# Patient Record
Sex: FEMALE | Race: WHITE | NOT HISPANIC OR LATINO | Employment: STUDENT | URBAN - METROPOLITAN AREA
[De-identification: names, ages, dates, MRNs, and addresses within clinical notes are randomized per-mention and may not be internally consistent; named-entity substitution may affect disease eponyms.]

---
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
## 2021-01-04 ENCOUNTER — HEALTH MAINTENANCE LETTER (OUTPATIENT)
Age: 21
End: 2021-01-04

## 2021-01-04 DIAGNOSIS — R09.1 PLEURISY: Primary | ICD-10-CM

## 2021-01-04 DIAGNOSIS — R07.9 CHEST PAIN, UNSPECIFIED TYPE: ICD-10-CM

## 2021-01-04 DIAGNOSIS — M94.0 COSTOCHONDRITIS: ICD-10-CM

## 2021-01-06 ENCOUNTER — PRE VISIT (OUTPATIENT)
Dept: GASTROENTEROLOGY | Facility: CLINIC | Age: 21
End: 2021-01-06

## 2021-01-06 DIAGNOSIS — R06.00 DYSPNEA: Primary | ICD-10-CM

## 2021-01-06 NOTE — TELEPHONE ENCOUNTER
RECORDS RECEIVED FROM: internal    DATE RECEIVED: 1.8.21   NOTES STATUS DETAILS   OFFICE NOTE from referring provider Internal  Jc Becerril   OFFICE NOTE from other specialist Internal  12.2.20 Jone FLORES     DISCHARGE SUMMARY from hospital na    DISCHARGE REPORT from the ER Internal  12.15.20 Nataba    MEDICATION LIST Internal     IMAGING  (NEED IMAGES AND REPORTS)     CT SCAN Internal  12.15.20    CHEST XRAY (CXR) na    TESTS     PULMONARY FUNCTION TESTING (PFT) Scheduled  1.8.21

## 2021-01-07 DIAGNOSIS — R06.00 DYSPNEA: ICD-10-CM

## 2021-01-07 PROCEDURE — 94375 RESPIRATORY FLOW VOLUME LOOP: CPT | Performed by: INTERNAL MEDICINE

## 2021-01-07 PROCEDURE — 94726 PLETHYSMOGRAPHY LUNG VOLUMES: CPT | Performed by: INTERNAL MEDICINE

## 2021-01-07 PROCEDURE — 94729 DIFFUSING CAPACITY: CPT | Performed by: INTERNAL MEDICINE

## 2021-01-08 ENCOUNTER — TELEPHONE (OUTPATIENT)
Dept: PULMONOLOGY | Facility: CLINIC | Age: 21
End: 2021-01-08

## 2021-01-08 ENCOUNTER — PRE VISIT (OUTPATIENT)
Dept: PULMONOLOGY | Facility: CLINIC | Age: 21
End: 2021-01-08

## 2021-01-08 ENCOUNTER — VIRTUAL VISIT (OUTPATIENT)
Dept: PULMONOLOGY | Facility: CLINIC | Age: 21
End: 2021-01-08
Attending: FAMILY MEDICINE
Payer: COMMERCIAL

## 2021-01-08 DIAGNOSIS — R07.1 CHEST PAIN MADE WORSE BY BREATHING: ICD-10-CM

## 2021-01-08 DIAGNOSIS — M94.0 COSTOCHONDRITIS, ACUTE: ICD-10-CM

## 2021-01-08 DIAGNOSIS — R06.09 DYSPNEA ON EXERTION: ICD-10-CM

## 2021-01-08 DIAGNOSIS — U07.1 PNEUMONIA DUE TO 2019 NOVEL CORONAVIRUS: ICD-10-CM

## 2021-01-08 DIAGNOSIS — J45.20 INTERMITTENT ASTHMA WITHOUT COMPLICATION, UNSPECIFIED ASTHMA SEVERITY: Primary | ICD-10-CM

## 2021-01-08 DIAGNOSIS — J12.82 PNEUMONIA DUE TO 2019 NOVEL CORONAVIRUS: ICD-10-CM

## 2021-01-08 LAB
DLCOUNC-%PRED-PRE: 100 %
DLCOUNC-PRE: 28.54 ML/MIN/MMHG
DLCOUNC-PRED: 28.27 ML/MIN/MMHG
ERV-%PRED-PRE: 62 %
ERV-PRE: 1.16 L
ERV-PRED: 1.86 L
EXPTIME-PRE: 6.02 SEC
FEF2575-%PRED-PRE: 100 %
FEF2575-PRE: 4.44 L/SEC
FEF2575-PRED: 4.42 L/SEC
FEFMAX-%PRED-PRE: 76 %
FEFMAX-PRE: 6.18 L/SEC
FEFMAX-PRED: 8.1 L/SEC
FEV1-%PRED-PRE: 112 %
FEV1-PRE: 4.5 L
FEV1FEV6-PRE: 81 %
FEV1FEV6-PRED: 87 %
FEV1FVC-PRE: 81 %
FEV1FVC-PRED: 89 %
FEV1SVC-PRE: 89 %
FEV1SVC-PRED: 77 %
FIFMAX-PRE: 5.76 L/SEC
FRCPLETH-%PRED-PRE: 98 %
FRCPLETH-PRE: 3.08 L
FRCPLETH-PRED: 3.12 L
FVC-%PRED-PRE: 123 %
FVC-PRE: 5.59 L
FVC-PRED: 4.54 L
IC-%PRED-PRE: 118 %
IC-PRE: 3.9 L
IC-PRED: 3.29 L
RVPLETH-%PRED-PRE: 112 %
RVPLETH-PRE: 1.92 L
RVPLETH-PRED: 1.71 L
TLCPLETH-%PRED-PRE: 111 %
TLCPLETH-PRE: 6.98 L
TLCPLETH-PRED: 6.28 L
VA-%PRED-PRE: 110 %
VA-PRE: 6.9 L
VC-%PRED-PRE: 98 %
VC-PRE: 5.07 L
VC-PRED: 5.15 L

## 2021-01-08 PROCEDURE — 99417 PROLNG OP E/M EACH 15 MIN: CPT | Performed by: INTERNAL MEDICINE

## 2021-01-08 PROCEDURE — 99215 OFFICE O/P EST HI 40 MIN: CPT | Mod: GT | Performed by: INTERNAL MEDICINE

## 2021-01-08 RX ORDER — ALBUTEROL SULFATE 90 UG/1
2 AEROSOL, METERED RESPIRATORY (INHALATION) EVERY 4 HOURS PRN
Qty: 1 INHALER | Refills: 11 | Status: SHIPPED | OUTPATIENT
Start: 2021-01-08

## 2021-01-08 RX ORDER — NAPROXEN 500 MG/1
500 TABLET ORAL 2 TIMES DAILY WITH MEALS
Qty: 60 TABLET | Refills: 11 | Status: SHIPPED | OUTPATIENT
Start: 2021-01-08 | End: 2021-04-16

## 2021-01-08 NOTE — LETTER
"    1/8/2021         RE: Clarice Flores  [address]   [facility] [address]        Dear Colleague,    Thank you for referring your patient, Clarice Flores, to the [facility] [place]. Please see a copy of my visit note below.    Clarice is a 20 year old who is being evaluated via a billable video visit.      How would you like to obtain your AVS? MyChart  If the video visit is dropped, the invitation should be resent by: Other e-mail: [email or web address] / [email or web address]  Will anyone else be joining your video visit? 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  COVID test + 11/4/2020 with SOB and chest pain accompanying this but has never gone away.  Initially treated for possible heartburn but had worsening chest pain  11/11 Virtual visit with Dr. Becerril ([facility])  Had cardiac Echo and MRI (12/2) - both nl   Had normal EKG and troponin  Then had \"downward spiral\" with increased chest pain and some ANN, making it hard to practice (and to focus on school.  12/15/2020 ED Visit with SOB and L sided chest pain  W/o fever cough with normal exam and EKG  Labs notable for mild leukopenia (3.4) and normal CVC, lytes and procaclitonin.  Chest CT with PAgram done and WNL w/o PE  Chest pain improved somewhat on Naproxed prescribed by Dr. Becerril for costochondritis.  Took a break from exercise and swimmin over Xmas holiday  Overall has improved somewhat but still symptomatic  Her chest pain persists and is primarily either central lui-sternal area and/or lower lateral chest/rib cage  Her SOB/ANN is " "soemwhat better  Her dry cough is \"coming back\" - w/o wheeze/phlegm and w/o hemoptysis  PFTs were difficult for her to do.    Meds:  BCP  Allergies: NKA  PMH:  concusccion ~1 year ago  FH negative for lung disease/allergies/asthma       Parents and 1 younger sister A&W  SH:  Never smoker; no other durgs/substances; no vaping; single; no children  ROS - o/w negative;  No off GERD Rx; tolerated Naproxen well previously (now off)          Current Outpatient Medications   Medication     norgestimate-ethinyl estradiol (ORTHO-CYCLEN) 0.25-35 MG-MCG tablet     omeprazole (FIRST-OMEPRAZOLE) 2 MG/ML SUSP     omeprazole (PRILOSEC) 40 MG DR capsule     No current facility-administered medications for this visit.      No Known Allergies  Past Medical History:   Diagnosis Date     Concussion      Stress fracture        No past surgical history on file.    Social History     Socioeconomic History     Marital status: Single     Spouse name: Not on file     Number of children: Not on file     Years of education: Not on file     Highest education level: Not on file   Occupational History     Not on file   Social Needs     Financial resource strain: Not on file     Food insecurity     Worry: Not on file     Inability: Not on file     Transportation needs     Medical: Not on file     Non-medical: Not on file   Tobacco Use     Smoking status: Never Smoker     Smokeless tobacco: Never Used   Substance and Sexual Activity     Alcohol use: Never     Frequency: Never     Drug use: Never     Sexual activity: Never   Lifestyle     Physical activity     Days per week: Not on file     Minutes per session: Not on file     Stress: Not on file   Relationships     Social connections     Talks on phone: Not on file     Gets together: Not on file     Attends Denominational service: Not on file     Active member of club or organization: Not on file     Attends meetings of clubs or organizations: Not on file     Relationship status: Not on file     " Intimate partner violence     Fear of current or ex partner: Not on file     Emotionally abused: Not on file     Physically abused: Not on file     Forced sexual activity: Not on file   Other Topics Concern     Not on file   Social History Narrative     Not on file       ROS Pulmonary  A complete ROS was otherwise negative except as noted in the HPI.  LMP 12/13/2020 (Exact Date)   Exam:   GENERAL APPEARANCE: Well developed, well nourished, alert, and in no apparent distress.  No tachypnea, wheeze, stridor, cough  EYES: PERRL, EOMI.  SKIN: no rash on limited exam  NEURO: Mentation intact, speech normal, normal strength and tone, no abnormality of CN II- XII noted  PSYCH: mentation appears normal. and affect normal/bright  Results:  Recent Results (from the past 168 hour(s))   Pulmonary Function Test    Collection Time: 01/07/21  6:05 AM   Result Value Ref Range    FVC-Pred 4.54 L    FVC-Pre 5.59 L    FVC-%Pred-Pre 123 %    FEV1-Pre 4.50 L    FEV1-%Pred-Pre 112 %    FEV1FVC-Pred 89 %    FEV1FVC-Pre 81 %    FEFMax-Pred 8.10 L/sec    FEFMax-Pre 6.18 L/sec    FEFMax-%Pred-Pre 76 %    FEF2575-Pred 4.42 L/sec    FEF2575-Pre 4.44 L/sec    NVK1249-%Pred-Pre 100 %    ExpTime-Pre 6.02 sec    FIFMax-Pre 5.76 L/sec    VC-Pred 5.15 L    VC-Pre 5.07 L    VC-%Pred-Pre 98 %    IC-Pred 3.29 L    IC-Pre 3.90 L    IC-%Pred-Pre 118 %    ERV-Pred 1.86 L    ERV-Pre 1.16 L    ERV-%Pred-Pre 62 %    FEV1FEV6-Pred 87 %    FEV1FEV6-Pre 81 %    FRCPleth-Pred 3.12 L    FRCPleth-Pre 3.08 L    FRCPleth-%Pred-Pre 98 %    RVPleth-Pred 1.71 L    RVPleth-Pre 1.92 L    RVPleth-%Pred-Pre 112 %    TLCPleth-Pred 6.28 L    TLCPleth-Pre 6.98 L    TLCPleth-%Pred-Pre 111 %    DLCOunc-Pred 28.27 ml/min/mmHg    DLCOunc-Pre 28.54 ml/min/mmHg    DLCOunc-%Pred-Pre 100 %    VA-Pre 6.90 L    VA-%Pred-Pre 110 %    FEV1SVC-Pred 77 %    FEV1SVC-Pre 89 %       Assessment and plan:   Post-COVID Pulmonary ANN & Chest Pain    Although the patient is not very ill, this is a  "complex diagnostic problem.  No evidence for post-COVID cardiac issues.  No evidence for PE and PFTs are relawtively normal.    Likely that COVID has triggered two problems - chest pain largely due to costochondritis with possible pleurtic component and bronchoscpasm - ? Exercise induced.    Plan  1)  Possible Asthma - exercise-induced and/or cough variant         Breo Ellipta inhaler - use 1 inhalation each AM. Rinse mouth after use         \"Rescue\" albuterol inhaler - use 2 puffs for shortness of breath or 15 minutes pre-exercise  2)  Costochondritis - restart Naproxen 500 mgs twice daily    Follow Up      Contact me in 2 weeks by email and let me know how she is  doing.      If not improved then I would like to see in person      For acute problems, please contact our nursing staf    Total time spent by me today including reviewing personally prior testing, with patient in video visit and providing web-based inhaler training resources = 90 minutes    Jc Richard MD            "

## 2021-01-08 NOTE — PROGRESS NOTES
"Clarice is a 20 year old who is being evaluated via a billable video visit.      How would you like to obtain your AVS? MyChart  If the video visit is dropped, the invitation should be resent by: Other e-mail: [email or web address]  Will anyone else be joining your video visit? 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  COVID test + 11/4/2020 with SOB and chest pain accompanying this but has never gone away.  Initially treated for possible heartburn but had worsening chest pain  11/11 Virtual visit with Dr. Becerril ([facility])  Had cardiac Echo and MRI (12/2) - both nl   Had normal EKG and troponin  Then had \"downward spiral\" with increased chest pain and some ANN, making it hard to practice (and to focus on school.  12/15/2020 ED Visit with SOB and L sided chest pain  W/o fever cough with normal exam and EKG  Labs notable for mild leukopenia (3.4) and normal CVC, lytes and procaclitonin.  Chest CT with PAgram done and WNL w/o PE  Chest pain improved somewhat on Naproxed prescribed by Dr. Becerril for costochondritis.  Took a break from exercise and swimmin over Xmas holiday  Overall has improved somewhat but still symptomatic  Her chest pain persists and is primarily either central lui-sternal area and/or lower lateral chest/rib cage  Her SOB/ANN is soemwhat better  Her dry cough is \"coming back\" - w/o wheeze/phlegm and w/o hemoptysis  PFTs were difficult for her to do.    Meds:  BCP  Allergies: NKA  PMH:  concusccion ~1 year ago  FH negative for lung disease/allergies/asthma       Parents and 1 younger sister A&W  SH:  " Never smoker; no other durgs/substances; no vaping; single; no children  ROS - o/w negative;  No off GERD Rx; tolerated Naproxen well previously (now off)          Current Outpatient Medications   Medication     norgestimate-ethinyl estradiol (ORTHO-CYCLEN) 0.25-35 MG-MCG tablet     omeprazole (FIRST-OMEPRAZOLE) 2 MG/ML SUSP     omeprazole (PRILOSEC) 40 MG DR capsule     No current facility-administered medications for this visit.      No Known Allergies  Past Medical History:   Diagnosis Date     Concussion      Stress fracture        No past surgical history on file.    Social History     Socioeconomic History     Marital status: Single     Spouse name: Not on file     Number of children: Not on file     Years of education: Not on file     Highest education level: Not on file   Occupational History     Not on file   Social Needs     Financial resource strain: Not on file     Food insecurity     Worry: Not on file     Inability: Not on file     Transportation needs     Medical: Not on file     Non-medical: Not on file   Tobacco Use     Smoking status: Never Smoker     Smokeless tobacco: Never Used   Substance and Sexual Activity     Alcohol use: Never     Frequency: Never     Drug use: Never     Sexual activity: Never   Lifestyle     Physical activity     Days per week: Not on file     Minutes per session: Not on file     Stress: Not on file   Relationships     Social connections     Talks on phone: Not on file     Gets together: Not on file     Attends Roman [employer] service: Not on file     Active member of club or organization: Not on file     Attends meetings of clubs or organizations: Not on file     Relationship status: Not on file     Intimate partner violence     Fear of current or ex partner: Not on file     Emotionally abused: Not on file     Physically abused: Not on file     Forced sexual activity: Not on file   Other Topics Concern     Not on file   Social History Narrative     Not on file       ROS  Pulmonary  A complete ROS was otherwise negative except as noted in the HPI.  LMP 12/13/2020 (Exact Date)   Exam:   GENERAL APPEARANCE: Well developed, well nourished, alert, and in no apparent distress.  No tachypnea, wheeze, stridor, cough  EYES: PERRL, EOMI.  SKIN: no rash on limited exam  NEURO: Mentation intact, speech normal, normal strength and tone, no abnormality of CN II- XII noted  PSYCH: mentation appears normal. and affect normal/bright  Results:  Recent Results (from the past 168 hour(s))   Pulmonary Function Test    Collection Time: 01/07/21  6:05 AM   Result Value Ref Range    FVC-Pred 4.54 L    FVC-Pre 5.59 L    FVC-%Pred-Pre 123 %    FEV1-Pre 4.50 L    FEV1-%Pred-Pre 112 %    FEV1FVC-Pred 89 %    FEV1FVC-Pre 81 %    FEFMax-Pred 8.10 L/sec    FEFMax-Pre 6.18 L/sec    FEFMax-%Pred-Pre 76 %    FEF2575-Pred 4.42 L/sec    FEF2575-Pre 4.44 L/sec    THE3644-%Pred-Pre 100 %    ExpTime-Pre 6.02 sec    FIFMax-Pre 5.76 L/sec    VC-Pred 5.15 L    VC-Pre 5.07 L    VC-%Pred-Pre 98 %    IC-Pred 3.29 L    IC-Pre 3.90 L    IC-%Pred-Pre 118 %    ERV-Pred 1.86 L    ERV-Pre 1.16 L    ERV-%Pred-Pre 62 %    FEV1FEV6-Pred 87 %    FEV1FEV6-Pre 81 %    FRCPleth-Pred 3.12 L    FRCPleth-Pre 3.08 L    FRCPleth-%Pred-Pre 98 %    RVPleth-Pred 1.71 L    RVPleth-Pre 1.92 L    RVPleth-%Pred-Pre 112 %    TLCPleth-Pred 6.28 L    TLCPleth-Pre 6.98 L    TLCPleth-%Pred-Pre 111 %    DLCOunc-Pred 28.27 ml/min/mmHg    DLCOunc-Pre 28.54 ml/min/mmHg    DLCOunc-%Pred-Pre 100 %    VA-Pre 6.90 L    VA-%Pred-Pre 110 %    FEV1SVC-Pred 77 %    FEV1SVC-Pre 89 %       Assessment and plan:   Post-COVID Pulmonary ANN & Chest Pain    Although the patient is not very ill, this is a complex diagnostic problem.  No evidence for post-COVID cardiac issues.  No evidence for PE and PFTs are relawtively normal.    Likely that COVID has triggered two problems - chest pain largely due to costochondritis with possible pleurtic component and bronchoscpasm - ?  "Exercise induced.    Plan  1)  Possible Asthma - exercise-induced and/or cough variant         Breo Ellipta inhaler - use 1 inhalation each AM. Rinse mouth after use         \"Rescue\" albuterol inhaler - use 2 puffs for shortness of breath or 15 minutes pre-exercise  2)  Costochondritis - restart Naproxen 500 mgs twice daily    Follow Up      Contact me in 2 weeks by email and let me know how she is  doing.      If not improved then I would like to see in person      For acute problems, please contact our nursing staf    Total time spent by me today including reviewing personally prior testing, with patient in video visit and providing web-based inhaler training resources = 90 minutes    Jc Richard MD              "

## 2021-01-08 NOTE — PATIENT INSTRUCTIONS
"Reviewed results of testing including Chest CT scan, Cardiac MRI and Pulmonary function testing  All are normal with possible exception of suggestion of mild asthma (airway obstruction) on PFTs  By your history it seems very likely that you have costochondritis, causing the tenderness and painful spots along the sternum (breastbone).  This seems not directly related to the shortness of breath with exercise.    Plan  1)  Possible Asthma - exercise-induced and/or cough variant         Breo Ellipta inhaler - use 1 inhalation each AM. Rinse mouth after use         \"Rescue\" albuterol inhaler - use 2 puffs for shortness of breath or 15 minutes pre-exercise  2)  Costochondritis - restart Naproxen 500 mgs twice daily    Follow Up      Contact me in 2 weeks by email and let me know how you are doing.      If not improved then I would like to see you in person      For acute problems, please contact our nursing staff (Sidra Ramsey RN) [phone]    MD [email or web address]  "

## 2021-01-13 NOTE — TELEPHONE ENCOUNTER
Central Prior Authorization Team   [phone]    PA Initiation    Medication: breo ellipta inhaler  Insurance Company: [employer] - Phone [phone] Fax [phone]  Pharmacy Filling the Rx: [id number] - [place], [place] - [address]   Filling Pharmacy Phone: [phone]  Filling Pharmacy Fax: [phone]  Start Date: 1/13/2021    Initiate PA by phone at [phone]. Case # [id number]

## 2021-01-19 DIAGNOSIS — J45.20 INTERMITTENT ASTHMA WITHOUT COMPLICATION, UNSPECIFIED ASTHMA SEVERITY: Primary | ICD-10-CM

## 2021-01-19 RX ORDER — BUDESONIDE AND FORMOTEROL FUMARATE DIHYDRATE 160; 4.5 UG/1; UG/1
2 AEROSOL RESPIRATORY (INHALATION) 2 TIMES DAILY
Qty: 10.2 G | Refills: 11 | Status: SHIPPED | OUTPATIENT
Start: 2021-01-19 | End: 2021-05-12

## 2021-01-19 NOTE — TELEPHONE ENCOUNTER
PRIOR AUTHORIZATION DENIED    Medication: breo ellipta inhaler-PA DENIED     Denial Date: 1/17/2021    Denial Rational: Criteria Not Met. Insurance states that patient must tried/failed Fluticasone propionate/salmeterol inhalation powder (or Wixela Inhub) and Symbicort, Dulera, Fluticasone/Salmeterol multidose dry powder inhaler (authorized generic AirDuo Respiclick).         Appeal Information:

## 2021-01-20 ENCOUNTER — TELEPHONE (OUTPATIENT)
Dept: PULMONOLOGY | Facility: CLINIC | Age: 21
End: 2021-01-20

## 2021-01-20 DIAGNOSIS — J45.20 INTERMITTENT ASTHMA WITHOUT COMPLICATION: Primary | ICD-10-CM

## 2021-01-20 NOTE — TELEPHONE ENCOUNTER
Spoke with pts pharmacy benefit plan [employer] at [phone] and found out that any alternatives are also very expensive and this is due to the pts high deductible of $2,700. However we could try to put in Wixela which will require a PA and this could come out to a cheaper price per [employer]. Will route message to RN to place Wixela.

## 2021-01-20 NOTE — TELEPHONE ENCOUNTER
Patients pharmacy sent this over: Patient requests new RX: Symbicort is $400, please try sending JACOBY 1 RX to see if that would change the price since per insurance generic would need a PA. OR try sending alternative medication. Thanks!

## 2021-01-20 NOTE — TELEPHONE ENCOUNTER
Called pharmacy to ask price of Advair that RN sent and they stated that Advair is around $297 which is about $100 cheaper than the Symbicort. I asked if they could run the generic Advair version Wixela and they said they are unable to because the insurance does not cover it. Called and left a message with the pt and let her know that the reason the inhaler is so expensive is due to her high deductible of $2700. Provided my number to call back to discuss if she has any questions.

## 2021-02-03 ENCOUNTER — OFFICE VISIT (OUTPATIENT)
Dept: ORTHOPEDICS | Facility: CLINIC | Age: 21
End: 2021-02-03
Payer: COMMERCIAL

## 2021-02-03 VITALS
WEIGHT: 140.6 LBS | BODY MASS INDEX: 19.05 KG/M2 | DIASTOLIC BLOOD PRESSURE: 73 MMHG | SYSTOLIC BLOOD PRESSURE: 117 MMHG | HEIGHT: 72 IN | HEART RATE: 65 BPM

## 2021-02-03 DIAGNOSIS — M94.0 COSTOCHONDRITIS: Primary | ICD-10-CM

## 2021-02-03 DIAGNOSIS — J45.40 MODERATE PERSISTENT ASTHMA WITHOUT COMPLICATION: ICD-10-CM

## 2021-02-03 ASSESSMENT — MIFFLIN-ST. JEOR: SCORE: 1524.76

## 2021-02-03 NOTE — LETTER
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  Clarice has been through quite a bit since last seeing me, and feels markedly improved overall.  She was seen by her family doctor at home and subsequently by pulmonology here at the [place] [facility].  Treatment was initiated with Breo and a rescue albuterol inhaler, this has helped her markedly.  She is also continuing to take naproxen for costochondritis.  This is helping as well.  She is having a rough week, training has increased over the past week, although she was able to participate in her knee last week.  She is feeling a slight increase in her shortness of breath and pain around her rib cage region.     PHYSICAL EXAM  Vitals:    02/03/21 0919   BP: 117/73   Pulse: 65   Weight: 63.8 kg (140 lb 9.6 oz)   Height: 1.829 m (6')     Physical Exam  Vitals signs reviewed.   Eyes:      Conjunctiva/sclera: Conjunctivae normal.   Cardiovascular:      Rate and Rhythm: Normal rate and regular rhythm.      Pulses: Normal pulses.      Heart sounds: No murmur.   Pulmonary:      Effort: Pulmonary effort is normal.      Breath sounds: Normal breath sounds. No wheezing.   Skin:     General: Skin is warm and dry.      Capillary Refill: Capillary refill takes less than 2 seconds.   Neurological:      General: No focal deficit present.      Mental Status: She is alert.   Psychiatric:         Mood and Affect: Mood normal.         Behavior: Behavior normal.           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    Clarice and I had a good discussion about this weekend, I do think it is reasonable for her to compete in the meet.  If she gets through the meet and feels that she cannot participate, she may consider foregoing an event or two.  Alternatively, if she does well she  may decide to practice fully next week.    Ultimately, if she does not do well this weekend is not feeling well throughout the course of the week I do think she should follow-up with her pulmonologist in person, she can reattempt pulmonary function testing as well.    It was a pleasure seeing Clarice.        cJ Becerril DO, CAQSM      This note was constructed using Dragon dictation software, please excuse any minor errors in spelling, grammar, or syntax.        Jc Becerril DO

## 2021-02-03 NOTE — PROGRESS NOTES
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  Clarice has been through quite a bit since last seeing me, and feels markedly improved overall.  She was seen by her family doctor at home and subsequently by pulmonology here at the [place].  Treatment was initiated with Breo and a rescue albuterol inhaler, this has helped her markedly.  She is also continuing to take naproxen for costochondritis.  This is helping as well.  She is having a rough week, training has increased over the past week, although she was able to participate in her knee last week.  She is feeling a slight increase in her shortness of breath and pain around her rib cage region.     PHYSICAL EXAM  Vitals:    02/03/21 0919   BP: 117/73   Pulse: 65   Weight: 63.8 kg (140 lb 9.6 oz)   Height: 1.829 m (6')     Physical Exam  Vitals signs reviewed.   Eyes:      Conjunctiva/sclera: Conjunctivae normal.   Cardiovascular:      Rate and Rhythm: Normal rate and regular rhythm.      Pulses: Normal pulses.      Heart sounds: No murmur.   Pulmonary:      Effort: Pulmonary effort is normal.      Breath sounds: Normal breath sounds. No wheezing.   Skin:     General: Skin is warm and dry.      Capillary Refill: Capillary refill takes less than 2 seconds.   Neurological:      General: No focal deficit present.      Mental Status: She is alert.   Psychiatric:         Mood and Affect: Mood normal.         Behavior: Behavior normal.           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    Clarice and I had a good discussion about this weekend, I do think it is reasonable for her to compete in the meet.  If she gets through the meet and feels that she cannot participate, she may consider foregoing an event or two.  Alternatively, if she does well she may decide to practice fully next week.    Ultimately, if she does not  do well this weekend is not feeling well throughout the course of the week I do think she should follow-up with her pulmonologist in person, she can reattempt pulmonary function testing as well.    It was a pleasure seeing Clarice.        Jc Becerril DO, CAM      This note was constructed using Dragon dictation software, please excuse any minor errors in spelling, grammar, or syntax.

## 2021-03-19 ENCOUNTER — VIRTUAL VISIT (OUTPATIENT)
Dept: PULMONOLOGY | Facility: CLINIC | Age: 21
End: 2021-03-19
Attending: INTERNAL MEDICINE
Payer: COMMERCIAL

## 2021-03-19 DIAGNOSIS — R06.09 DYSPNEA ON EXERTION: ICD-10-CM

## 2021-03-19 DIAGNOSIS — M94.0 COSTOCHONDRITIS, ACUTE: ICD-10-CM

## 2021-03-19 DIAGNOSIS — R07.1 CHEST PAIN MADE WORSE BY BREATHING: ICD-10-CM

## 2021-03-19 DIAGNOSIS — J12.82 PNEUMONIA DUE TO 2019 NOVEL CORONAVIRUS: ICD-10-CM

## 2021-03-19 DIAGNOSIS — J45.20 INTERMITTENT ASTHMA WITHOUT COMPLICATION, UNSPECIFIED ASTHMA SEVERITY: Primary | ICD-10-CM

## 2021-03-19 DIAGNOSIS — U07.1 PNEUMONIA DUE TO 2019 NOVEL CORONAVIRUS: ICD-10-CM

## 2021-03-19 PROCEDURE — 99214 OFFICE O/P EST MOD 30 MIN: CPT | Mod: GT | Performed by: INTERNAL MEDICINE

## 2021-03-19 RX ORDER — MONTELUKAST SODIUM 10 MG/1
10 TABLET ORAL EVERY EVENING
Qty: 30 TABLET | Refills: 11 | Status: SHIPPED | OUTPATIENT
Start: 2021-03-19 | End: 2021-12-29

## 2021-03-19 NOTE — PATIENT INSTRUCTIONS
Post COVID Increased Shortness of Breath with Exertion and Cough - worsened with exercise    There has been some improvement with the inhaled albuterol and Symbicort, but still not back to baseline.    Plan:  Add Incruse Ellipta inhaler once daily in AM (along with the Symbicort) for at least 2-3 weeks.  IF Incruse helps, but you are not back to baseline with Incruse added, then add Singulair 1 pill in the evening.  IF Incruse has no benefit after 2-3 weeks, then stop it and start the Singulair 1 pill in the evening.    Please send me a [employer] message in 2-3 weeks with an update.    For questions, problems, etc, please call Sidra Ramsey RN or colleagues [phone]     Let's put an appointment on the books for 3 months and we can adjust as needed.Ple

## 2021-03-19 NOTE — PROGRESS NOTES
Clarice is a 20 year old who is being evaluated via a billable video visit.      How would you like to obtain your AVS? ThermaSource[employer]Open Home Pro  If the video visit is dropped, the invitation should be resent by: Other e-mail: [email or web address]  Will anyone else be joining your video visit? No      Video Start Time: 9:30am  Video-Visit Details    Type of service:  Video Visit    Video End Time:  Start time 9:34 AM  End time 10:06 AM    Originating Location (pt. 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      Workup has included normal CT with PAgram; echocardiogram; and cardiac MRI.  PFTs 1/7/2021 are essentially normal    Taking albuterol 15 minutes before exercise  Taking 1-2 puffs of Symbicort BID with mouth rinsing  Is tolerating practices better, but still has trouble breathing during practice and 'lungs are sensitive to the touch.'  Does not tolerate fast paced sprint swimming.  Dry cough is present through the day and worse with exercising.  This makes it harder for her to keep going.    Continues to have chest pain - particularly at costochondral joints.  Sternum itself is nontender but sides of ribs also are tender.  Pain is not clearly helped by naproxen and it does irritate her stomach. She feels the pain is not severe and doesn't hinder her activity - but is not pleasant.    Coughs through the day - dry without phlegm.  Increased with exercise during practices.    This return clinic visit was supposed to  be in person so that I could exam her, but was not scheduled that way.      Current Outpatient Medications   Medication     albuterol (PROAIR HFA/PROVENTIL HFA/VENTOLIN HFA) 108 (90 Base) MCG/ACT inhaler     budesonide-formoterol (SYMBICORT) 160-4.5 MCG/ACT Inhaler     naproxen (NAPROSYN) 500 MG tablet     norgestimate-ethinyl estradiol (ORTHO-CYCLEN) 0.25-35 MG-MCG tablet     No current facility-administered medications for this visit.      No Known Allergies  Past Medical History:   Diagnosis Date     Concussion      Stress fracture        No past surgical history on file.    Social History     Socioeconomic History     Marital status: Single     Spouse name: Not on file     Number of children: Not on file     Years of education: Not on file     Highest education level: Not on file   Occupational History     Not on file   Social Needs     Financial resource strain: Not on file     Food insecurity     Worry: Not on file     Inability: Not on file     Transportation needs     Medical: Not on file     Non-medical: Not on file   Tobacco Use     Smoking status: Never Smoker     Smokeless tobacco: Never Used   Substance and Sexual Activity     Alcohol use: Never     Frequency: Never     Drug use: Never     Sexual activity: Never   Lifestyle     Physical activity     Days per week: Not on file     Minutes per session: Not on file     Stress: Not on file   Relationships     Social connections     Talks on phone: Not on file     Gets together: Not on file     Attends Mormonism service: Not on file     Active member of club or organization: Not on file     Attends meetings of clubs or organizations: Not on file     Relationship status: Not on file     Intimate partner violence     Fear of current or ex partner: Not on file     Emotionally abused: Not on file     Physically abused: Not on file     Forced sexual activity: Not on file   Other Topics Concern     Not on file   Social History Narrative     Not on file       ROS  Pulmonary  A complete ROS was otherwise negative except as noted in the HPI.  There were no vitals taken for this visit.  Exam:   GENERAL APPEARANCE: Well developed, well nourished, alert, and in no apparent distress.  Scant cough.  No wheeze, stridor, tachypnea or cyanosis  EYES: PERRL, EOMI  SKIN: no rash on limited exam. No cyanosis  NEURO: Mentation intact, speech normal,   PSYCH: mentation appears normal. and affect normal/bright  Results:  No results found for this or any previous visit (from the past 168 hour(s)).    Assessment and plan:   POST COVID RESPIRATORY ISSUES - ANN; COUGH & CHEST PAIN with essentially normal w/u and some partial subjective response (improved exercise capacity) to LABA/ICS and KAVEH.  Still having coughing - suni w/ exercise  Some continued limitation in high intensity exercise performance  Continued atypical chest pain with component of costochondritis..    Plan:  Add Incruse Ellipta inhaler once daily in AM (along with the Symbicort) for at least 2-3 weeks.  IF Incruse helps, but you are not back to baseline with Incruse added, then add Singulair 1 pill in the evening.  IF Incruse has no benefit after 2-3 weeks, then stop it and start the Singulair 1 pill in the evening.  Consider referral to pain clinic for injection of costochondral joints if a relatively small number remain significantly problematic.  (Didn't tolerate Naproxen and not very helpful)  Send me a [employer] message in 2-3 weeks with an update.  RTC 3 weeks  Given contact information for Sidra Ramsey RN and staff.  I spent > 40 minutes dedicated to Ms Flores's care, including review of chart, past PFTs and Chest CT and literature search for information about post-COVID costochondritis  Jc Richard MD

## 2021-03-19 NOTE — LETTER
3/19/2021         RE: Clarice Flores  [address]   [place] [address]        Dear Colleague,    Thank you for referring your patient, Clarice Flores, to the [facility]. Please see a copy of my visit note below.    Clarice is a 20 year old who is being evaluated via a billable video visit.      How would you like to obtain your AVS? Art-Exchangehart  If the video visit is dropped, the invitation should be resent by: Other e-mail: PsychologyOnline  Will anyone else be joining your video visit? No      Video Start Time: 9:30am  Video-Visit Details    Type of service:  Video Visit    Video End Time:  Start time 9:34 AM  End time 10:06 AM    Originating Location (pt. 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      Workup has included normal CT with PAgram; echocardiogram; and cardiac MRI.  PFTs 1/7/2021 are essentially normal    Taking albuterol 15 minutes before exercise  Taking 1-2 puffs of Symbicort BID with mouth rinsing  Is tolerating practices better, but still has trouble breathing during practice and 'lungs are sensitive to the touch.'  Does not tolerate fast paced sprint swimming.  Dry cough is present through the day and worse with exercising.  This makes it harder for her to keep going.    Continues to have chest pain - particularly at costochondral joints.  Sternum itself is nontender but sides of ribs also are tender.  Pain is not clearly  helped by naproxen and it does irritate her stomach. She feels the pain is not severe and doesn't hinder her activity - but is not pleasant.    Coughs through the day - dry without phlegm.  Increased with exercise during practices.    This return clinic visit was supposed to be in person so that I could exam her, but was not scheduled that way.      Current Outpatient Medications   Medication     albuterol (PROAIR HFA/PROVENTIL HFA/VENTOLIN HFA) 108 (90 Base) MCG/ACT inhaler     budesonide-formoterol (SYMBICORT) 160-4.5 MCG/ACT Inhaler     naproxen (NAPROSYN) 500 MG tablet     norgestimate-ethinyl estradiol (ORTHO-CYCLEN) 0.25-35 MG-MCG tablet     No current facility-administered medications for this visit.      No Known Allergies  Past Medical History:   Diagnosis Date     Concussion      Stress fracture        No past surgical history on file.    Social History     Socioeconomic History     Marital status: Single     Spouse name: Not on file     Number of children: Not on file     Years of education: Not on file     Highest education level: Not on file   Occupational History     Not on file   Social Needs     Financial resource strain: Not on file     Food insecurity     Worry: Not on file     Inability: Not on file     Transportation needs     Medical: Not on file     Non-medical: Not on file   Tobacco Use     Smoking status: Never Smoker     Smokeless tobacco: Never Used   Substance and Sexual Activity     Alcohol use: Never     Frequency: Never     Drug use: Never     Sexual activity: Never   Lifestyle     Physical activity     Days per week: Not on file     Minutes per session: Not on file     Stress: Not on file   Relationships     Social connections     Talks on phone: Not on file     Gets together: Not on file     Attends Cheondoism service: Not on file     Active member of club or organization: Not on file     Attends meetings of clubs or organizations: Not on file     Relationship status: Not on file      Intimate partner violence     Fear of current or ex partner: Not on file     Emotionally abused: Not on file     Physically abused: Not on file     Forced sexual activity: Not on file   Other Topics Concern     Not on file   Social History Narrative     Not on file       ROS Pulmonary  A complete ROS was otherwise negative except as noted in the HPI.  There were no vitals taken for this visit.  Exam:   GENERAL APPEARANCE: Well developed, well nourished, alert, and in no apparent distress.  Scant cough.  No wheeze, stridor, tachypnea or cyanosis  EYES: PERRL, EOMI  SKIN: no rash on limited exam. No cyanosis  NEURO: Mentation intact, speech normal,   PSYCH: mentation appears normal. and affect normal/bright  Results:  No results found for this or any previous visit (from the past 168 hour(s)).    Assessment and plan:   POST COVID RESPIRATORY ISSUES - ANN; COUGH & CHEST PAIN with essentially normal w/u and some partial subjective response (improved exercise capacity) to LABA/ICS and KAVEH.  Still having coughing - suni w/ exercise  Some continued limitation in high intensity exercise performance  Continued atypical chest pain with component of costochondritis..    Plan:  Add Incruse Ellipta inhaler once daily in AM (along with the Symbicort) for at least 2-3 weeks.  IF Incruse helps, but you are not back to baseline with Incruse added, then add Singulair 1 pill in the evening.  IF Incruse has no benefit after 2-3 weeks, then stop it and start the Singulair 1 pill in the evening.  Consider referral to pain clinic for injection of costochondral joints if a relatively small number remain significantly problematic.  (Didn't tolerate Naproxen and not very helpful)  Send me a [employer] message in 2-3 weeks with an update.  RTC 3 weeks  Given contact information for Sidra Ramsey RN and staff.  I spent > 40 minutes dedicated to Ms Flores's care, including review of chart, past PFTs and Chest CT and literature search for  information about post-COVID costochondritis  Jc Richard MD

## 2021-03-24 ENCOUNTER — OFFICE VISIT (OUTPATIENT)
Dept: ORTHOPEDICS | Facility: CLINIC | Age: 21
End: 2021-03-24
Payer: COMMERCIAL

## 2021-03-24 VITALS
HEIGHT: 72 IN | HEART RATE: 73 BPM | DIASTOLIC BLOOD PRESSURE: 72 MMHG | BODY MASS INDEX: 19.05 KG/M2 | SYSTOLIC BLOOD PRESSURE: 117 MMHG | WEIGHT: 140.6 LBS

## 2021-03-24 DIAGNOSIS — R09.1 PLEURISY: ICD-10-CM

## 2021-03-24 DIAGNOSIS — M94.0 COSTOCHONDRITIS: Primary | ICD-10-CM

## 2021-03-24 ASSESSMENT — MIFFLIN-ST. JEOR: SCORE: 1524.76

## 2021-03-24 NOTE — PROGRESS NOTES
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  Clarice has been doing somewhat better, although it has been a long haul for her.  She feels about 50% better than when she was initially referred to pulmonology.  However, she continues to feel shortness of breath with extreme exertion, particularly with splinting.  She also feels pain in her chest that is increased with deeper, rapid breathing.  She is recently seen by her pulmonologist and has added Incruse to her Symbicort regimen.  If this is not effective in the coming weeks she is going to add Singulair, nightly.    She has been great coming up and has questions centering around participation during spring break.  She is staying here, hopefully to train.        Additional history: as documented    MEDICAL HISTORY  There is no problem list on file for this patient.      Current Outpatient Medications   Medication Sig Dispense Refill     albuterol (PROAIR HFA/PROVENTIL HFA/VENTOLIN HFA) 108 (90 Base) MCG/ACT inhaler Inhale 2 puffs into the lungs every 4 hours as needed for shortness of breath / dyspnea or wheezing (and 15 minutes before exercise) 1 Inhaler 11     budesonide-formoterol (SYMBICORT) 160-4.5 MCG/ACT Inhaler Inhale 2 puffs into the lungs 2 times daily 10.2 g 11     montelukast (SINGULAIR) 10 MG tablet Take 1 tablet (10 mg) by mouth every evening 30 tablet 11     norgestimate-ethinyl estradiol (ORTHO-CYCLEN) 0.25-35 MG-MCG tablet Take 1 tablet by mouth daily       umeclidinium (INCRUSE ELLIPTA) 62.5 MCG/INH inhaler Inhale 1 puff into the lungs daily 1 each 11     naproxen (NAPROSYN) 500 MG tablet Take 1 tablet (500 mg) by mouth 2 times daily (with meals) (Patient not taking: Reported on 3/24/2021) 60 tablet 11       No Known Allergies    No family history on file.    Additional medical/Social/Surgical histories reviewed in [facility] and updated as appropriate.         PHYSICAL EXAM    Vitals:    03/24/21 0937   BP: 117/72   Pulse: 73   Weight: 63.8 kg (140 lb 9.6 oz)   Height: 1.829 m (6')     Physical Exam  Vitals signs reviewed. Exam conducted with a chaperone present.   Constitutional:       Appearance: Normal appearance.   Eyes:      Conjunctiva/sclera: Conjunctivae normal.   Cardiovascular:      Rate and Rhythm: Normal rate and regular rhythm.   Pulmonary:      Effort: Pulmonary effort is normal.      Breath sounds: Normal breath sounds. No wheezing.   Neurological:      General: No focal deficit present.      Mental Status: She is alert. Mental status is at baseline.      Gait: Gait normal.   Psychiatric:         Mood and Affect: Mood normal.         Behavior: Behavior normal.              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    I had a good discussion with Clarice centering around her symptoms and a plan moving forward.  I am happy that she is doing better, although I do recognize that this is taking some time.  She is encouraged, I share in this.    I do agree with her pulmonologist that it is safe to participate, as long as she is able to tolerate the pain regarding her costochondritis.  She is getting some relief with lidocaine patches, I do think adding diclofenac gel as an adjunct will be helpful.  We could consider injection based therapies, if her pain is becoming too severe.    After some discussion I do think it is advisable for her to stay and continue her training, as she is tolerating her modified training schedule well with regards to her breathing.    We can follow-up in the coming 4 to 8 weeks, depending upon how she is feeling.    It was a pleasure seeing Clarice today.    Jc Becerril DO, [facility]  Primary Care Sports Medicine      This note was constructed using Dragon dictation software, please excuse any minor errors in spelling, grammar, or  syntax.

## 2021-03-24 NOTE — LETTER
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  Clarice has been doing somewhat better, although it has been a long haul for her.  She feels about 50% better than when she was initially referred to pulmonology.  However, she continues to feel shortness of breath with extreme exertion, particularly with splinting.  She also feels pain in her chest that is increased with deeper, rapid breathing.  She is recently seen by her pulmonologist and has added Incruse to her Symbicort regimen.  If this is not effective in the coming weeks she is going to add Singulair, nightly.    She has been great coming up and has questions centering around participation during spring break.  She is staying here, hopefully to train.        Additional history: as documented    MEDICAL HISTORY  There is no problem list on file for this patient.      Current Outpatient Medications   Medication Sig Dispense Refill     albuterol (PROAIR HFA/PROVENTIL HFA/VENTOLIN HFA) 108 (90 Base) MCG/ACT inhaler Inhale 2 puffs into the lungs every 4 hours as needed for shortness of breath / dyspnea or wheezing (and 15 minutes before exercise) 1 Inhaler 11     budesonide-formoterol (SYMBICORT) 160-4.5 MCG/ACT Inhaler Inhale 2 puffs into the lungs 2 times daily 10.2 g 11     montelukast (SINGULAIR) 10 MG tablet Take 1 tablet (10 mg) by mouth every evening 30 tablet 11     norgestimate-ethinyl estradiol (ORTHO-CYCLEN) 0.25-35 MG-MCG tablet Take 1 tablet by mouth daily       umeclidinium (INCRUSE ELLIPTA) 62.5 MCG/INH inhaler Inhale 1 puff into the lungs daily 1 each 11     naproxen (NAPROSYN) 500 MG tablet Take 1 tablet (500 mg) by mouth 2 times daily (with meals) (Patient not taking: Reported on 3/24/2021) 60 tablet 11       No Known Allergies    No family history on file.    Additional  medical/Social/Surgical histories reviewed in [facility] and updated as appropriate.        PHYSICAL EXAM    Vitals:    03/24/21 0937   BP: 117/72   Pulse: 73   Weight: 63.8 kg (140 lb 9.6 oz)   Height: 1.829 m (6')     Physical Exam  Vitals signs reviewed. Exam conducted with a chaperone present.   Constitutional:       Appearance: Normal appearance.   Eyes:      Conjunctiva/sclera: Conjunctivae normal.   Cardiovascular:      Rate and Rhythm: Normal rate and regular rhythm.   Pulmonary:      Effort: Pulmonary effort is normal.      Breath sounds: Normal breath sounds. No wheezing.   Neurological:      General: No focal deficit present.      Mental Status: She is alert. Mental status is at baseline.      Gait: Gait normal.   Psychiatric:         Mood and Affect: Mood normal.         Behavior: Behavior normal.              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    I had a good discussion with Clarice centering around her symptoms and a plan moving forward.  I am happy that she is doing better, although I do recognize that this is taking some time.  She is encouraged, I share in this.    I do agree with her pulmonologist that it is safe to participate, as long as she is able to tolerate the pain regarding her costochondritis.  She is getting some relief with lidocaine patches, I do think adding diclofenac gel as an adjunct will be helpful.  We could consider injection based therapies, if her pain is becoming too severe.    After some discussion I do think it is advisable for her to stay and continue her training, as she is tolerating her modified training schedule well with regards to her breathing.    We can follow-up in the coming 4 to 8 weeks, depending upon how she is feeling.    It was a pleasure seeing lCarice today.    Jc Becerril DO, [facility]  Primary Care Sports Medicine      This note was constructed using  Dragon dictation software, please excuse any minor errors in spelling, grammar, or syntax.        Jc Becerril, DO

## 2021-04-14 ENCOUNTER — TELEPHONE (OUTPATIENT)
Dept: PULMONOLOGY | Facility: CLINIC | Age: 21
End: 2021-04-14

## 2021-04-14 DIAGNOSIS — J45.20 INTERMITTENT ASTHMA: Primary | ICD-10-CM

## 2021-04-15 ENCOUNTER — TELEPHONE (OUTPATIENT)
Dept: PULMONOLOGY | Facility: CLINIC | Age: 21
End: 2021-04-15

## 2021-04-15 ASSESSMENT — ENCOUNTER SYMPTOMS
COUGH DISTURBING SLEEP: 1
DYSPNEA ON EXERTION: 1
POSTURAL DYSPNEA: 1
WHEEZING: 0
COUGH: 1
SNORES LOUDLY: 0
SHORTNESS OF BREATH: 1
HEMOPTYSIS: 0
SPUTUM PRODUCTION: 1

## 2021-04-15 NOTE — TELEPHONE ENCOUNTER
Spoke with pt and appt and testing with Dr. Richard is scheduled for tomorrow. Details confirmed with pt.

## 2021-04-16 ENCOUNTER — OFFICE VISIT (OUTPATIENT)
Dept: PULMONOLOGY | Facility: CLINIC | Age: 21
End: 2021-04-16
Attending: INTERNAL MEDICINE
Payer: COMMERCIAL

## 2021-04-16 VITALS
SYSTOLIC BLOOD PRESSURE: 103 MMHG | BODY MASS INDEX: 19.1 KG/M2 | HEART RATE: 75 BPM | RESPIRATION RATE: 17 BRPM | OXYGEN SATURATION: 98 % | HEIGHT: 72 IN | DIASTOLIC BLOOD PRESSURE: 68 MMHG | WEIGHT: 141 LBS

## 2021-04-16 DIAGNOSIS — J45.990 EXERCISE INDUCED BRONCHOSPASM: ICD-10-CM

## 2021-04-16 DIAGNOSIS — R06.09 DYSPNEA ON EXERTION: Primary | ICD-10-CM

## 2021-04-16 DIAGNOSIS — J45.20 INTERMITTENT ASTHMA: ICD-10-CM

## 2021-04-16 DIAGNOSIS — J45.40 MODERATE PERSISTENT ASTHMA WITHOUT COMPLICATION: ICD-10-CM

## 2021-04-16 PROCEDURE — G0463 HOSPITAL OUTPT CLINIC VISIT: HCPCS | Mod: 25

## 2021-04-16 PROCEDURE — 94726 PLETHYSMOGRAPHY LUNG VOLUMES: CPT | Performed by: INTERNAL MEDICINE

## 2021-04-16 PROCEDURE — 94375 RESPIRATORY FLOW VOLUME LOOP: CPT | Performed by: INTERNAL MEDICINE

## 2021-04-16 PROCEDURE — 94729 DIFFUSING CAPACITY: CPT | Performed by: INTERNAL MEDICINE

## 2021-04-16 PROCEDURE — 99214 OFFICE O/P EST MOD 30 MIN: CPT | Mod: 25 | Performed by: INTERNAL MEDICINE

## 2021-04-16 ASSESSMENT — PAIN SCALES - GENERAL: PAINLEVEL: NO PAIN (0)

## 2021-04-16 ASSESSMENT — MIFFLIN-ST. JEOR: SCORE: 1526.57

## 2021-04-16 NOTE — LETTER
"    4/16/2021         RE: Clarice Flores  [address] [place] [address]        Dear Colleague,    Thank you for referring your patient, Clarice Flores, to the [facility] FOR LUNG [facility] AND [facility] [place]. Please see a copy of my visit note below.    Reason for Visit  Clarice Flores is a 20 year old year old female who is being seen for RECHECK (Return follow up appt)    Pulmonary HPI    The patient was seen and examined by Jc Richard MD     Ms. 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  She developed COVID in early 11/2020.  She has had persistent dyspnea on exertion, wheezing, dry cough and chest tightness and pain including costochondritis since then.  I last saw her by video visit on 03/19/2021.  At that time, she had had some improvement with treating her for exercise-induced asthma.  Her prior workup had included normal CT with PA-gram, echocardiogram and cardiac MRI, with essentially normal PFTs.  At the time of the last visit, she was using 1-2 puffs of Symbicort b.i.d. taking albuterol 15 minutes before exertion.  She was tolerating practice better, but still had trouble and said her lungs \"are sensitive to the touch.\"   Fast-paced sprint swimming gave her difficulty, and he had a dry cough present through the day, worse with exercising.  She also had pain that by description localized to the costochondral joints.  At that visit, I added Incruse Ellipta inhaler once in the a.m., along with the Symbicort for 2-3 weeks, and suggested a trial of adding Singulair if that did not improve her.  I also considered referral to a Pain Clinic for injection of the costochondral joints if that persisted.      Ms. Flores returns today.  She tells me that her costochondral pain is significantly improved and is not a major problem at present.  She is not doing too much lifting and pectoral exercise.  She continues to have " difficulty at very high-intensity swimming, with feeling a little bit dizzy and lightheaded.  She is worried that she may not have enough oxygen.  She does feel that the inhalers have helped her significantly over time, but it is not clear that the Incruse Ellipta added to the prior Symbicort that she was on previously.  She did not try the Singulair in addition yet.  She continues to have dry cough, but is not producing any sputum.  She denies fevers, chills or sweats.  Otherwise, there is no change in her general medical condition.         Current Outpatient Medications   Medication     albuterol (PROAIR HFA/PROVENTIL HFA/VENTOLIN HFA) 108 (90 Base) MCG/ACT inhaler     budesonide-formoterol (SYMBICORT) 160-4.5 MCG/ACT Inhaler     montelukast (SINGULAIR) 10 MG tablet     norgestimate-ethinyl estradiol (ORTHO-CYCLEN) 0.25-35 MG-MCG tablet     umeclidinium (INCRUSE ELLIPTA) 62.5 MCG/INH inhaler     No current facility-administered medications for this visit.      No Known Allergies  Past Medical History:   Diagnosis Date     Concussion      Stress fracture        No past surgical history on file.    Social History     Socioeconomic History     Marital status: Single     Spouse name: Not on file     Number of children: Not on file     Years of education: Not on file     Highest education level: Not on file   Occupational History     Not on file   Social Needs     Financial resource strain: Not on file     Food insecurity     Worry: Not on file     Inability: Not on file     Transportation needs     Medical: Not on file     Non-medical: Not on file   Tobacco Use     Smoking status: Never Smoker     Smokeless tobacco: Never Used   Substance and Sexual Activity     Alcohol use: Never     Frequency: Never     Drug use: Never     Sexual activity: Never   Lifestyle     Physical activity     Days per week: Not on file     Minutes per session: Not on file     Stress: Not on file   Relationships     Social connections      Talks on phone: Not on file     Gets together: Not on file     Attends Catholic service: Not on file     Active member of club or organization: Not on file     Attends meetings of clubs or organizations: Not on file     Relationship status: Not on file     Intimate partner violence     Fear of current or ex partner: Not on file     Emotionally abused: Not on file     Physically abused: Not on file     Forced sexual activity: Not on file   Other Topics Concern     Not on file   Social History Narrative     Not on file       ROS Pulmonary    A complete ROS was otherwise negative except as noted in the HPI.  /68   Pulse 75   Resp 17   Ht 1.829 m (6')   Wt 64 kg (141 lb)   LMP 03/21/2021 (Approximate)   SpO2 98%   BMI 19.12 kg/m    Exam:   GENERAL APPEARANCE: Well developed, well nourished, alert, thin and in no apparent distress. No wheeze, stridor, tachypnea  EYES: PERRL, EOMI  HENT: Nasal mucosa with no edema and no hyperemia. No nasal polyps.  MOUTH: Oral mucosa is moist, without any lesions, no tonsillar enlargement, no oropharyngeal exudate.  NECK: supple, no masses, no thyromegaly.  LYMPHATICS: No significant axillary, cervical, or supraclavicular nodes.  RESP: normal inspection, papation, percussion, good air flow throughout.  No crackles. No rhonchi. No wheezes.  Mild tenderness bilaterally over costochondral junctions.  CV: Normal S1, S2, regular rhythm, normal rate. No murmur.  No rub. No gallop. No LE edema.   ABDOMEN:  Bowel sounds normal, soft, nontender, no HSM or masses.   MS: extremities normal. No clubbing. No cyanosis.  SKIN: no rash on limited exam  NEURO: Mentation intact, speech normal, normal strength and tone, normal gait and stance  PSYCH: mentation appears normal. and affect normal/bright  Results:  Recent Results (from the past 168 hour(s))   Pulmonary Function Test    Collection Time: 04/16/21  9:42 AM   Result Value Ref Range    FVC-Pred 4.54 L    FVC-Pre 5.75 L     FVC-%Pred-Pre 126 %    FEV1-Pre 4.84 L    FEV1-%Pred-Pre 121 %    FEV1FVC-Pred 89 %    FEV1FVC-Pre 84 %    FEFMax-Pred 8.10 L/sec    FEFMax-Pre 7.63 L/sec    FEFMax-%Pred-Pre 94 %    FEF2575-Pred 4.42 L/sec    FEF2575-Pre 5.00 L/sec    XYB4347-%Pred-Pre 112 %    ExpTime-Pre 6.01 sec    FIFMax-Pre 6.30 L/sec    VC-Pred 5.15 L    VC-Pre 5.46 L    VC-%Pred-Pre 105 %    IC-Pred 3.26 L    IC-Pre 3.69 L    IC-%Pred-Pre 113 %    ERV-Pred 1.89 L    ERV-Pre 1.76 L    ERV-%Pred-Pre 93 %    FEV1FEV6-Pred 87 %    FEV1FEV6-Pre 84 %    FRCPleth-Pred 3.12 L    FRCPleth-Pre 3.65 L    FRCPleth-%Pred-Pre 116 %    RVPleth-Pred 1.71 L    RVPleth-Pre 1.89 L    RVPleth-%Pred-Pre 110 %    TLCPleth-Pred 6.28 L    TLCPleth-Pre 7.34 L    TLCPleth-%Pred-Pre 116 %    DLCOunc-Pred 28.27 ml/min/mmHg    DLCOunc-Pre 30.19 ml/min/mmHg    DLCOunc-%Pred-Pre 106 %    VA-Pre 7.08 L    VA-%Pred-Pre 113 %    FEV1SVC-Pred 77 %    FEV1SVC-Pre 89 %       Assessment and plan:   1.  Dyspnea on exertion/probable asthma post-COVID:  Ms. Flores has had some gradual improvement.  Her PFTs today have improved, FEV1/FVC even over her normal prior value from January.  Each of her FEV1 and FVC are in the 125%-130% predicted range.  However, she still is limited in her very high-intensity workouts.  It is not clear whether the Incruse Ellipta did or did not help her and she has not tried the Singulair yet.  Plan is for her to have a cardiopulmonary exercise test to assess her maximal exertion and to see if there is any sign of any cardiac issue, hypoxemia or exercise-induced bronchospasm that worsens with exercise.  We also will switch her inhaler to once daily Trelegy (with coupon provided).  She will continue to use her rescue inhaler before exertion.  She also will start the empiric trial of Singulair 10 mg in the evening for 2-4 weeks to see if this has added benefit.  I asked her to be in touch with me within that time period to let us know if she is improving or  not.  We also will schedule her for an appointment in 3 months, and she was given contact information for our nursing staff if there are issues in the interim.      The costochondral pain is significantly better.  She is getting her second COVID vaccination.  Hopefully, her s gradual improvement will continue.      I spent a total of 30 minutes devoted to her care today.      Jc Richard MD         Answers for HPI/ROS submitted by the patient on 4/15/2021   General Symptoms: No  Skin Symptoms: No  HENT Symptoms: No  EYE SYMPTOMS: No  HEART SYMPTOMS: No  LUNG SYMPTOMS: Yes  INTESTINAL SYMPTOMS: No  URINARY SYMPTOMS: No  GYNECOLOGIC SYMPTOMS: No  BREAST SYMPTOMS: No  SKELETAL SYMPTOMS: No  BLOOD SYMPTOMS: No  NERVOUS SYSTEM SYMPTOMS: No  MENTAL HEALTH SYMPTOMS: No  PEDS Symptoms: No  Cough: Yes  Sputum or phlegm: Yes  Coughing up blood: No  Difficulty breating or shortness of breath: Yes  Snoring: No  Wheezing: No  Difficulty breathing on exertion: Yes  Nighttime Cough: Yes  Difficulty breathing when lying flat: Yes        Again, thank you for allowing me to participate in the care of your patient.        Sincerely,        Jc Richard MD

## 2021-04-16 NOTE — NURSING NOTE
Chief Complaint   Patient presents with     RECHECK     Return follow up appt    Medications reviewed and vital signs taken.   Aydin Patton CMA

## 2021-04-16 NOTE — PATIENT INSTRUCTIONS
Costochondral chest pain is significantly improved  Shortness of breath with exertion is improving, but still is not back to usual    Recommend:  1.  Cardiopulmonary exercise test to measure peak performance and assess for exercise-induced bronchospasm or drop in O2 level  2.  Will switch inhalers to a single once daily Trelegy (coupons provided)  3.  Use 'rescue' albuterol inhaler before exertion  4.  Empiric trial of singulair 10 mgs in the evening for 2-4 weeks and see if this has added benefit.    I will call you about the exercise test results (takes several days afterward to get interpretation)  Contact me in 2-4 weeks with update  For questions or for contact, you can work through Sidra Ramsey RN ([phone])

## 2021-04-16 NOTE — PROGRESS NOTES
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  She developed COVID in early 11/2020.  She has had persistent dyspnea on exertion, wheezing, dry cough and chest tightness and pain including costochondritis since then.  I last saw her by video visit on 03/19/2021.  At that time, she had had some improvement with treating her for exercise-induced asthma.  Her prior workup had included normal CT with PA-gram, echocardiogram and cardiac MRI, with essentially normal PFTs.  At the time of the last visit, she was using 1-2 puffs of Symbicort b.i.d. taking albuterol 15 minutes before exertion.  She was tolerating practice better, but still had trouble and said her lungs \"are sensitive to the touch.\"   Fast-paced sprint swimming gave her difficulty, and he had a dry cough present through the day, worse with exercising.  She also had pain that by description localized to the costochondral joints.  At that visit, I added Incruse Ellipta inhaler once in the a.m., along with the Symbicort for 2-3 weeks, and suggested a trial of adding Singulair if that did not improve her.  I also considered referral to a Pain Clinic for injection of the costochondral joints if that persisted.      Ms. Flores returns today.  She tells me that her costochondral pain is significantly improved and is not a major problem at present.  She is not doing too much lifting and pectoral exercise.  She continues to have difficulty at very high-intensity swimming, with feeling a little bit dizzy and lightheaded.  She is worried that she may not have enough oxygen.  She does feel that the inhalers have helped her significantly over time, but it is not clear that the Incruse Ellipta added to the " prior Symbicort that she was on previously.  She did not try the Singulair in addition yet.  She continues to have dry cough, but is not producing any sputum.  She denies fevers, chills or sweats.  Otherwise, there is no change in her general medical condition.         Current Outpatient Medications   Medication     albuterol (PROAIR HFA/PROVENTIL HFA/VENTOLIN HFA) 108 (90 Base) MCG/ACT inhaler     budesonide-formoterol (SYMBICORT) 160-4.5 MCG/ACT Inhaler     montelukast (SINGULAIR) 10 MG tablet     norgestimate-ethinyl estradiol (ORTHO-CYCLEN) 0.25-35 MG-MCG tablet     umeclidinium (INCRUSE ELLIPTA) 62.5 MCG/INH inhaler     No current facility-administered medications for this visit.      No Known Allergies  Past Medical History:   Diagnosis Date     Concussion      Stress fracture        No past surgical history on file.    Social History     Socioeconomic History     Marital status: Single     Spouse name: Not on file     Number of children: Not on file     Years of education: Not on file     Highest education level: Not on file   Occupational History     Not on file   Social Needs     Financial resource strain: Not on file     Food insecurity     Worry: Not on file     Inability: Not on file     Transportation needs     Medical: Not on file     Non-medical: Not on file   Tobacco Use     Smoking status: Never Smoker     Smokeless tobacco: Never Used   Substance and Sexual Activity     Alcohol use: Never     Frequency: Never     Drug use: Never     Sexual activity: Never   Lifestyle     Physical activity     Days per week: Not on file     Minutes per session: Not on file     Stress: Not on file   Relationships     Social connections     Talks on phone: Not on file     Gets together: Not on file     Attends [employer] service: Not on file     Active member of club or organization: Not on file     Attends meetings of clubs or organizations: Not on file     Relationship status: Not on file     Intimate partner  violence     Fear of current or ex partner: Not on file     Emotionally abused: Not on file     Physically abused: Not on file     Forced sexual activity: Not on file   Other Topics Concern     Not on file   Social History Narrative     Not on file       ROS Pulmonary    A complete ROS was otherwise negative except as noted in the HPI.  /68   Pulse 75   Resp 17   Ht 1.829 m (6')   Wt 64 kg (141 lb)   LMP 03/21/2021 (Approximate)   SpO2 98%   BMI 19.12 kg/m    Exam:   GENERAL APPEARANCE: Well developed, well nourished, alert, thin and in no apparent distress. No wheeze, stridor, tachypnea  EYES: PERRL, EOMI  HENT: Nasal mucosa with no edema and no hyperemia. No nasal polyps.  MOUTH: Oral mucosa is moist, without any lesions, no tonsillar enlargement, no oropharyngeal exudate.  NECK: supple, no masses, no thyromegaly.  LYMPHATICS: No significant axillary, cervical, or supraclavicular nodes.  RESP: normal inspection, papation, percussion, good air flow throughout.  No crackles. No rhonchi. No wheezes.  Mild tenderness bilaterally over costochondral junctions.  CV: Normal S1, S2, regular rhythm, normal rate. No murmur.  No rub. No gallop. No LE edema.   ABDOMEN:  Bowel sounds normal, soft, nontender, no HSM or masses.   MS: extremities normal. No clubbing. No cyanosis.  SKIN: no rash on limited exam  NEURO: Mentation intact, speech normal, normal strength and tone, normal gait and stance  PSYCH: mentation appears normal. and affect normal/bright  Results:  Recent Results (from the past 168 hour(s))   Pulmonary Function Test    Collection Time: 04/16/21  9:42 AM   Result Value Ref Range    FVC-Pred 4.54 L    FVC-Pre 5.75 L    FVC-%Pred-Pre 126 %    FEV1-Pre 4.84 L    FEV1-%Pred-Pre 121 %    FEV1FVC-Pred 89 %    FEV1FVC-Pre 84 %    FEFMax-Pred 8.10 L/sec    FEFMax-Pre 7.63 L/sec    FEFMax-%Pred-Pre 94 %    FEF2575-Pred 4.42 L/sec    FEF2575-Pre 5.00 L/sec    OGY9125-%Pred-Pre 112 %    ExpTime-Pre 6.01 sec     FIFMax-Pre 6.30 L/sec    VC-Pred 5.15 L    VC-Pre 5.46 L    VC-%Pred-Pre 105 %    IC-Pred 3.26 L    IC-Pre 3.69 L    IC-%Pred-Pre 113 %    ERV-Pred 1.89 L    ERV-Pre 1.76 L    ERV-%Pred-Pre 93 %    FEV1FEV6-Pred 87 %    FEV1FEV6-Pre 84 %    FRCPleth-Pred 3.12 L    FRCPleth-Pre 3.65 L    FRCPleth-%Pred-Pre 116 %    RVPleth-Pred 1.71 L    RVPleth-Pre 1.89 L    RVPleth-%Pred-Pre 110 %    TLCPleth-Pred 6.28 L    TLCPleth-Pre 7.34 L    TLCPleth-%Pred-Pre 116 %    DLCOunc-Pred 28.27 ml/min/mmHg    DLCOunc-Pre 30.19 ml/min/mmHg    DLCOunc-%Pred-Pre 106 %    VA-Pre 7.08 L    VA-%Pred-Pre 113 %    FEV1SVC-Pred 77 %    FEV1SVC-Pre 89 %       Assessment and plan:   1.  Dyspnea on exertion/probable asthma post-COVID:  Ms. Flores has had some gradual improvement.  Her PFTs today have improved, FEV1/FVC even over her normal prior value from January.  Each of her FEV1 and FVC are in the 125%-130% predicted range.  However, she still is limited in her very high-intensity workouts.  It is not clear whether the Incruse Ellipta did or did not help her and she has not tried the Singulair yet.  Plan is for her to have a cardiopulmonary exercise test to assess her maximal exertion and to see if there is any sign of any cardiac issue, hypoxemia or exercise-induced bronchospasm that worsens with exercise.  We also will switch her inhaler to once daily Trelegy (with coupon provided).  She will continue to use her rescue inhaler before exertion.  She also will start the empiric trial of Singulair 10 mg in the evening for 2-4 weeks to see if this has added benefit.  I asked her to be in touch with me within that time period to let us know if she is improving or not.  We also will schedule her for an appointment in 3 months, and she was given contact information for our nursing staff if there are issues in the interim.      The costochondral pain is significantly better.  She is getting her second COVID vaccination.  Hopefully, her s gradual  improvement will continue.      I spent a total of 30 minutes devoted to her care today.      Jc Richard MD         Answers for HPI/ROS submitted by the patient on 4/15/2021   General Symptoms: No  Skin Symptoms: No  HENT Symptoms: No  EYE SYMPTOMS: No  HEART SYMPTOMS: No  LUNG SYMPTOMS: Yes  INTESTINAL SYMPTOMS: No  URINARY SYMPTOMS: No  GYNECOLOGIC SYMPTOMS: No  BREAST SYMPTOMS: No  SKELETAL SYMPTOMS: No  BLOOD SYMPTOMS: No  NERVOUS SYSTEM SYMPTOMS: No  MENTAL HEALTH SYMPTOMS: No  PEDS Symptoms: No  Cough: Yes  Sputum or phlegm: Yes  Coughing up blood: No  Difficulty breating or shortness of breath: Yes  Snoring: No  Wheezing: No  Difficulty breathing on exertion: Yes  Nighttime Cough: Yes  Difficulty breathing when lying flat: Yes

## 2021-04-19 ENCOUNTER — DOCUMENTATION ONLY (OUTPATIENT)
Dept: FAMILY MEDICINE | Facility: CLINIC | Age: 21
End: 2021-04-19

## 2021-04-19 LAB
DLCOUNC-%PRED-PRE: 106 %
DLCOUNC-PRE: 30.19 ML/MIN/MMHG
DLCOUNC-PRED: 28.27 ML/MIN/MMHG
ERV-%PRED-PRE: 93 %
ERV-PRE: 1.76 L
ERV-PRED: 1.89 L
EXPTIME-PRE: 6.01 SEC
FEF2575-%PRED-PRE: 112 %
FEF2575-PRE: 5 L/SEC
FEF2575-PRED: 4.42 L/SEC
FEFMAX-%PRED-PRE: 94 %
FEFMAX-PRE: 7.63 L/SEC
FEFMAX-PRED: 8.1 L/SEC
FEV1-%PRED-PRE: 121 %
FEV1-PRE: 4.84 L
FEV1FEV6-PRE: 84 %
FEV1FEV6-PRED: 87 %
FEV1FVC-PRE: 84 %
FEV1FVC-PRED: 89 %
FEV1SVC-PRE: 89 %
FEV1SVC-PRED: 77 %
FIFMAX-PRE: 6.3 L/SEC
FRCPLETH-%PRED-PRE: 116 %
FRCPLETH-PRE: 3.65 L
FRCPLETH-PRED: 3.12 L
FVC-%PRED-PRE: 126 %
FVC-PRE: 5.75 L
FVC-PRED: 4.54 L
IC-%PRED-PRE: 113 %
IC-PRE: 3.69 L
IC-PRED: 3.26 L
RVPLETH-%PRED-PRE: 110 %
RVPLETH-PRE: 1.89 L
RVPLETH-PRED: 1.71 L
TLCPLETH-%PRED-PRE: 116 %
TLCPLETH-PRE: 7.34 L
TLCPLETH-PRED: 6.28 L
VA-%PRED-PRE: 113 %
VA-PRE: 7.08 L
VC-%PRED-PRE: 105 %
VC-PRE: 5.46 L
VC-PRED: 5.15 L

## 2021-04-22 ENCOUNTER — TELEPHONE (OUTPATIENT)
Dept: PULMONOLOGY | Facility: CLINIC | Age: 21
End: 2021-04-22

## 2021-04-22 NOTE — PROGRESS NOTES
[facility] ATHLETIC MEDICINE  [facility]   Sport Psychology Progress Note      Location of Visit: Session conducted via telehealth. Clarice reports that her physical location is: [address] [place], [place]  Date of Visit: April 19, 2020  Duration of Session: 45 minutes    Suicide Assessment:  Clarice denies current urges to self-harm, suicidal ideation, means, plan, or intent.    Mental Status & Observations:  Clarice appeared generally alert and oriented. Dress was appropriate to the weather and occasion. Grooming and hygiene were appropriate. Eye contact was good. Speech was of normal volume and normal. Mood was appropriate with congruent affect. Thought processes were relevant, logical and goal-directed. Thought content was within normal limits with no evidence of psychotic or paranoid features. Memory appeared intact. Insight and judgment appeared age appropriate with good focus in session.  She exhibited normal motor activity during the appointment.  Behavior was candid and cooperative.      Observations and response to counseling:  Clarice arrived to session on time and was actively engaged throughout.    Reports that she is doing well emotionally and academically. Noted desire to reconnect with Sport Psych to check in with provider, discuss recent support given to a friend who is managing mental health concerns, and conflict with roommate.    Intervention:  Empathetic listening and supportive counseling offered throughout.    Clarice provided update on exercise induced asthma diagnosis and use of inhalers during hard practices. Noted difficulty adjusting to harder practices but doing better at present.    Further explored and processed trauma experienced while attending previous university.    Primarily utilized session to process social stressors in her friendship with someone needing mental health resources. Encouraged Clarice to share Sport Psych and campus counseling resources with them. Encouraged use  of boundaries. Reminded Clarice to prioritize self care needs in this friendship.    Encouraged use of effective communication strategies to resolve conflict with roommate.     Therapy objectives/goals:  Provide support    Therapy follow-up plan:  Individual counseling sessions as needed.      Paula Watson PsyD

## 2021-04-23 ENCOUNTER — TELEPHONE (OUTPATIENT)
Dept: PULMONOLOGY | Facility: CLINIC | Age: 21
End: 2021-04-23

## 2021-04-23 NOTE — TELEPHONE ENCOUNTER
JODEE with direct call back number and imaging number to schedule cardiopulmonary stress test placed by Dr. Richard.

## 2021-04-26 ENCOUNTER — TELEPHONE (OUTPATIENT)
Dept: PULMONOLOGY | Facility: CLINIC | Age: 21
End: 2021-04-26

## 2021-04-26 NOTE — TELEPHONE ENCOUNTER
Spoke with pt about was able to arrange appt for her cardiopulmonary stress test. Details confirmed with pt.

## 2021-04-28 ENCOUNTER — HOSPITAL ENCOUNTER (OUTPATIENT)
Dept: CARDIOLOGY | Facility: CLINIC | Age: 21
Discharge: HOME | End: 2021-04-28
Attending: INTERNAL MEDICINE | Admitting: INTERNAL MEDICINE
Payer: COMMERCIAL

## 2021-04-28 DIAGNOSIS — R06.09 DYSPNEA ON EXERTION: ICD-10-CM

## 2021-04-28 DIAGNOSIS — J45.990 EXERCISE INDUCED BRONCHOSPASM: ICD-10-CM

## 2021-04-28 DIAGNOSIS — J45.40 MODERATE PERSISTENT ASTHMA WITHOUT COMPLICATION: ICD-10-CM

## 2021-04-28 PROCEDURE — 94621 CARDIOPULM EXERCISE TESTING: CPT

## 2021-04-28 PROCEDURE — 94621 CARDIOPULM EXERCISE TESTING: CPT | Mod: 26 | Performed by: INTERNAL MEDICINE

## 2021-05-03 ENCOUNTER — DOCUMENTATION ONLY (OUTPATIENT)
Dept: FAMILY MEDICINE | Facility: CLINIC | Age: 21
End: 2021-05-03

## 2021-05-12 ENCOUNTER — TELEPHONE (OUTPATIENT)
Dept: PULMONOLOGY | Facility: CLINIC | Age: 21
End: 2021-05-12

## 2021-05-12 DIAGNOSIS — J45.990 EXERCISE INDUCED BRONCHOSPASM: ICD-10-CM

## 2021-05-12 DIAGNOSIS — R06.09 DYSPNEA ON EXERTION: Primary | ICD-10-CM

## 2021-05-12 NOTE — TELEPHONE ENCOUNTER
Pulmonary Staff    I got an email from Ms. Flores on Sunday re CPETT results.  I had previously reviewed the results and today reviewed them again in consultation with Dr. Kennedy Contreras.    Comments:  1)  Not cardiac limited (didn't hit predicted max HR) and no evidence for cardiac dysfunction (O2 pulse, etc)  2).  Immediately post exercise there was 10% decrease in FEV1 and FVC - c/w either some EIB and/or possible VC dysfunction.  Need to look at initial post exercise flow volume loop to differentiate.  3)  VO2 max was 124% of predicted, but this likely is about 10% lower than expected in a high performance athlete  4)  She continued to exercise for a relatively short period of time after hitting anaerobic threshold, raising possibility of resp muscle fatigue.    In terms of treatable findings, the major one is to continue treatment for airway obstruction and EIB.  We should look at 1st flow volume loop post exercise for vocal cord dysfunction.  Also worth considering whether chest pain from costochondritis may be leading to lower Vt and some component of restriction at high intensity exercise.    I call ed Ms Flores on her cell phone but got voice message and a message was left indicating that I would call again.    Jc Richard MD

## 2021-05-12 NOTE — TELEPHONE ENCOUNTER
Pulmonary Staff    I was able to reach Ms Flores and discussed the CPETT results with her (as per my earlier note from today)    She tells me that she had some lightheadedness and dizziness which caused her to stop - more than chest pain/discomfort (former not mentioned in official report)    Overall she feels that she is improving and that inhaled medications are helping     Recommendations  1)  Continue to use Trelegy every day and pre-exercise Albuterol MDI  2)  Trial of Lidoderm patch pre-exercise  3)  Safe for her to push herself  4)  Consider possible trial of local anesthetic injection for costochondritis if it persists and pain is limiting    Jc Richard MD

## 2021-05-18 NOTE — PROGRESS NOTES
[facility] ATHLETIC MEDICINE  [facility]   Sport Psychology Progress Note      Location of Visit: Session conducted via telehealth. Clarice reports that her physical location is: [address] [place], [place]  Date of Visit: May 3, 2020  Duration of Session: 45 minutes    Suicide Assessment:  Clarice denies current urges to self-harm, suicidal ideation, means, plan, or intent.    Mental Status & Observations:  Clarice appeared generally alert and oriented. Dress was appropriate to the weather and occasion. Grooming and hygiene were appropriate. Eye contact was good. Speech was of normal volume and normal. Mood was appropriate with congruent affect. Thought processes were relevant, logical and goal-directed. Thought content was within normal limits with no evidence of psychotic or paranoid features. Memory appeared intact. Insight and judgment appeared age appropriate with good focus in session.  She exhibited normal motor activity during the appointment.  Behavior was candid and cooperative.      Observations and response to counseling:  Clarice arrived to session on time and was actively engaged throughout. Displayed mood and affect that was congruent to content discussed.    Reports having taken necessary space from her friend who needed additional emotional support to manage stressors.    Noted effective use of stress management strategies such as coloring/drawing and reading for leisure.    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    Clarice reports plan to remain on campus during the summer.    Intervention:  Empathetic listening and supportive counseling offered throughout.    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 Discussed ways to effectively communicate concerns to authority figures.    Encouraged continued prioritization of self care activities and stress coping techniques.    Discussed upcoming optional swim meet on  Saturday and whether or not she plans to participate.    Reviewed progress toward goals in Sport Psych sessions. Clarice is aware of how to utilize resources during the summer and connect with Sport Psych as needed.    Therapy objectives/goals:  Provide support    Therapy follow-up plan:  Individual counseling sessions as needed.      Paula Watson PsyD

## 2021-07-19 ENCOUNTER — DOCUMENTATION ONLY (OUTPATIENT)
Dept: FAMILY MEDICINE | Facility: CLINIC | Age: 21
End: 2021-07-19

## 2021-07-22 NOTE — PROGRESS NOTES
[facility] ATHLETIC MEDICINE  [facility] [place] [facility]   Sport Psychology Progress Note      Location of Visit: Session conducted via telehealth. Clarice reports that her physical location is: [address] [place], [place]  Date of Visit: July 19, 2021  Duration of Session: 45 minutes    Suicide Assessment:  Clarice denies current urges to self-harm, suicidal ideation, means, plan, or intent.    Mental Status & Observations:  Clarice appeared generally alert and oriented. Dress was appropriate to the weather and occasion. Grooming and hygiene were appropriate. Eye contact was good. Speech was of normal volume and normal. Mood was appropriate with congruent affect. Thought processes were relevant, logical and goal-directed. Thought content was within normal limits with no evidence of psychotic or paranoid features. Memory appeared intact. Insight and judgment appeared age appropriate with good focus in session.  She exhibited normal motor activity during the appointment.  Behavior was candid and cooperative.      Observations and response to counseling:  Clarice arrived to session on time and was actively engaged throughout. Displayed mood and affect that was congruent to content discussed.    Reports increased feelings of anxiety lately stemming from roommate conflict, sport performance, and other life stressors.    Noted continued benefit of writing in a journal and utilization of the Calm kurt.    Intervention:  Empathetic listening and supportive counseling offered throughout.    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 Reports that she is happy with her current coaches but continues to work through the effects of previous negative coaching experiences.    Noted that her final summer meet is in two weeks. Discussed sport performance and goals for the Fall.    Explored recent roommate conflict and plan to relocate for the 0216-4281 academic year.    Encouraged  continued use of emotion regulation strategies and anxiety management exercises.    Therapy objectives/goals:  Provide support    Therapy follow-up plan:  Individual counseling sessions as needed.      Paula Watson PsyD

## 2021-07-24 ENCOUNTER — TELEPHONE (OUTPATIENT)
Dept: PULMONOLOGY | Facility: CLINIC | Age: 21
End: 2021-07-24

## 2021-07-24 DIAGNOSIS — R06.09 DYSPNEA ON EXERTION: ICD-10-CM

## 2021-07-24 DIAGNOSIS — J45.40 MODERATE PERSISTENT ASTHMA WITHOUT COMPLICATION: Primary | ICD-10-CM

## 2021-07-24 DIAGNOSIS — J45.990 EXERCISE INDUCED BRONCHOSPASM: ICD-10-CM

## 2021-07-24 NOTE — TELEPHONE ENCOUNTER
Pulmonary Staff    I received the email below from Ms. Flores this AM    Hi Dr. Richard,   I just wanted to check in and send you and update like we had discussed in the Spring. I ve continued to see progress with my lungs and there ability to withstand exercise and high intense training. I don t have any limitations but I am still aware of the difficulty breathing during high intensity exercise. I also just ran out of my symbicort inhaler (which I have noticed definitely helps during practices and meets). I went to go refill the prescription and it was not listed as one of my prescribed medications. Was just curious about that as well? Hope everything is well with you!     Thanks,     Clarice Flores     I telephoned her but got voice mail with a full mailbox. I refilled the Symbicort as requested.    Jc Richard    Addendum 7/25/21    I received a return email from Ms. Flores  Asking that prescription be filled at [address] [place] [address]  Order placed for high strength  I received a warning from [facility] that only low strength was Level 1 benefit for her insurance.  I am cc'ing Sidra Ramsey RN to see if we can obtain authorization without increased out of pocket cost to patient as high strength is the appropriate dose for this patient and she has had partial (but only partial) clinical response to it    Jc Richard MD

## 2021-07-25 RX ORDER — BUDESONIDE AND FORMOTEROL FUMARATE DIHYDRATE 160; 4.5 UG/1; UG/1
2 AEROSOL RESPIRATORY (INHALATION) 2 TIMES DAILY
Qty: 10.2 G | Refills: 11 | Status: SHIPPED | OUTPATIENT
Start: 2021-07-25 | End: 2021-12-29

## 2021-07-26 ENCOUNTER — TELEPHONE (OUTPATIENT)
Dept: PULMONOLOGY | Facility: CLINIC | Age: 21
End: 2021-07-26

## 2021-07-26 NOTE — CONFIDENTIAL NOTE
Called pharmacy and Symbicort 160 brand name went through insurance for $0 co pay. Notified Dr Richard.

## 2021-09-15 ENCOUNTER — DOCUMENTATION ONLY (OUTPATIENT)
Dept: FAMILY MEDICINE | Facility: CLINIC | Age: 21
End: 2021-09-15

## 2021-09-15 DIAGNOSIS — F43.9 STRESS: Primary | ICD-10-CM

## 2021-10-01 ENCOUNTER — DOCUMENTATION ONLY (OUTPATIENT)
Dept: FAMILY MEDICINE | Facility: CLINIC | Age: 21
End: 2021-10-01

## 2021-10-01 DIAGNOSIS — F43.9 STRESS: Primary | ICD-10-CM

## 2021-10-04 NOTE — PROGRESS NOTES
[facility] ATHLETIC MEDICINE  [facility]   Sport Psychology Progress Note      Location of Visit: Session conducted via telehealth. Clarice reports that her physical location is: [address] [place], [place]  Date of Visit: September 15, 2021  Duration of Session: 45 minutes    Suicide Assessment:  Clarice denies current urges to self-harm, suicidal ideation, means, plan, or intent.    Mental Status & Observations:  Clarice appeared generally alert and oriented. Dress was appropriate to the weather and occasion. Grooming and hygiene were appropriate. Eye contact was good. Speech was of normal volume and normal. Mood was appropriate with congruent affect. Thought processes were relevant, logical and goal-directed. Thought content was within normal limits with no evidence of psychotic or paranoid features. Memory appeared intact. Insight and judgment appeared age appropriate with good focus in session.  She exhibited normal motor activity during the appointment.  Behavior was candid and cooperative.      Observations and response to counseling:  Clarice arrived to session on time and was actively engaged throughout. Displayed mood and affect that was congruent to content discussed.    Reports purchasing a kitten recently, improvement in overall mood, continued interpersonal stressors, and adjustment to the semester and sport season.    Utilized session to explore and process recent sources of frustration, review time management strategies, and discuss anxiety/stress management techniques.    Intervention:  Empathetic listening and supportive counseling offered throughout.    Offered validation and normalized concerns discussed by Clarice. Explored and processed interpersonal issues with roommates and peers. Encouraged clear and open communication and prioritization of needs.    Discussed sport performance, goals for this season, and dynamics with teammates and coaches.    Clarice reports feeling physically well and  motivated in her sport at this time.    Discussed goals for the semester and opportunities to connect with peers and explore interests outside of sport.      Therapy objectives/goals:  Provide support    Therapy follow-up plan:  Individual counseling sessions as needed.      Paula Watson PsyD

## 2021-10-06 ENCOUNTER — OFFICE VISIT (OUTPATIENT)
Dept: ORTHOPEDICS | Facility: CLINIC | Age: 21
End: 2021-10-06
Payer: COMMERCIAL

## 2021-10-06 VITALS
HEIGHT: 72 IN | DIASTOLIC BLOOD PRESSURE: 72 MMHG | BODY MASS INDEX: 19.8 KG/M2 | SYSTOLIC BLOOD PRESSURE: 115 MMHG | WEIGHT: 146.2 LBS | HEART RATE: 88 BPM

## 2021-10-06 DIAGNOSIS — M94.0 COSTOCHONDRITIS: Primary | ICD-10-CM

## 2021-10-06 RX ORDER — MELOXICAM 15 MG/1
15 TABLET ORAL DAILY
Qty: 30 TABLET | Refills: 1 | Status: SHIPPED | OUTPATIENT
Start: 2021-10-06 | End: 2021-12-29

## 2021-10-06 ASSESSMENT — MIFFLIN-ST. JEOR: SCORE: 1545.16

## 2021-10-06 NOTE — PROGRESS NOTES
HISTORY OF PRESENT ILLNESS  Ms. Flores is a pleasant 21 year old female following up with pain in her chest and rib cage region.  Clarice has been doing much better overall since last seeing me.  In the interim she has been seen and managed by pulmonology and has been steadily improving with her breathing function and pain.  However, she does note that whenever she is experiencing an upper respiratory illness pain in her lungs and rib cage region will return.  This is extremely sore to the touch, painful whenever she breathes, and painful to have a swimsuit on.  Notably, she denies any fever chills or shortness of breath at the moment, although she does feel somewhat rundown.     PHYSICAL EXAM  Vitals:    10/06/21 0858   BP: 115/72   Pulse: 88   Weight: 66.3 kg (146 lb 3.2 oz)   Height: 1.829 m (6')   Physical Exam  Vitals reviewed. Exam conducted with a chaperone present.   Constitutional:       Appearance: Normal appearance.   Eyes:      Conjunctiva/sclera: Conjunctivae normal.   Cardiovascular:      Rate and Rhythm: Normal rate and regular rhythm.   Pulmonary:      Effort: Pulmonary effort is normal.   Abdominal:      General: Abdomen is flat. Bowel sounds are normal. There is no distension.      Palpations: Abdomen is soft. There is no mass.      Tenderness: There is no abdominal tenderness. There is no guarding.   Musculoskeletal:      Comments: Tender bilateral rib cage with palpation, pain with respiration   Skin:     General: Skin is warm and dry.   Neurological:      General: No focal deficit present.      Mental Status: She is alert. Mental status is at baseline.   Psychiatric:         Mood and Affect: Mood normal.         Behavior: Behavior normal.           ASSESSMENT & PLAN  Ms. Flores is a 21 year old female following up with pain in her chest and rib cage.    Clarice is likely experiencing an episode of costochondritis, which she was experiencing with her past upper respiratory infections.  We did discuss  that NSAIDs should help greatly with her symptoms, I am prescribing her meloxicam to take when she is experiencing symptoms.  If this is ineffective we could try an alternative NSAID.    If she does well we can follow-up as needed for this and other issues.    It was a pleasure seeing Clarice.        Jc Becerril DO, [facility]      This note was constructed using Dragon dictation software, please excuse any minor errors in spelling, grammar, or syntax.

## 2021-10-06 NOTE — LETTER
10/6/2021      RE: Clarice Flores  [address] [place] [address]       HISTORY OF PRESENT ILLNESS  Ms. Flores is a pleasant 21 year old female following up with pain in her chest and rib cage region.  Clarice has been doing much better overall since last seeing me.  In the interim she has been seen and managed by pulmonology and has been steadily improving with her breathing function and pain.  However, she does note that whenever she is experiencing an upper respiratory illness pain in her lungs and rib cage region will return.  This is extremely sore to the touch, painful whenever she breathes, and painful to have a swimsuit on.  Notably, she denies any fever chills or shortness of breath at the moment, although she does feel somewhat rundown.     PHYSICAL EXAM  Vitals:    10/06/21 0858   BP: 115/72   Pulse: 88   Weight: 66.3 kg (146 lb 3.2 oz)   Height: 1.829 m (6')   Physical Exam  Vitals reviewed. Exam conducted with a chaperone present.   Constitutional:       Appearance: Normal appearance.   Eyes:      Conjunctiva/sclera: Conjunctivae normal.   Cardiovascular:      Rate and Rhythm: Normal rate and regular rhythm.   Pulmonary:      Effort: Pulmonary effort is normal.   Abdominal:      General: Abdomen is flat. Bowel sounds are normal. There is no distension.      Palpations: Abdomen is soft. There is no mass.      Tenderness: There is no abdominal tenderness. There is no guarding.   Musculoskeletal:      Comments: Tender bilateral rib cage with palpation, pain with respiration   Skin:     General: Skin is warm and dry.   Neurological:      General: No focal deficit present.      Mental Status: She is alert. Mental status is at baseline.   Psychiatric:         Mood and Affect: Mood normal.         Behavior: Behavior normal.           ASSESSMENT & PLAN  Ms. Flores is a 21 year old female following up with pain in her chest and rib cage.    Clarice is likely experiencing an episode of costochondritis, which she was  experiencing with her past upper respiratory infections.  We did discuss that NSAIDs should help greatly with her symptoms, I am prescribing her meloxicam to take when she is experiencing symptoms.  If this is ineffective we could try an alternative NSAID.    If she does well we can follow-up as needed for this and other issues.    It was a pleasure seeing Clarice.        Jc Becerril DO, CAQSM      This note was constructed using Dragon dictation software, please excuse any minor errors in spelling, grammar, or syntax.        Jc Becerril DO

## 2021-10-08 NOTE — PROGRESS NOTES
I have reviewed and agree with the document of this note.  I did not personally see the patient.    Sterling Cealya, PhD LP, CMPC

## 2021-10-11 ENCOUNTER — HEALTH MAINTENANCE LETTER (OUTPATIENT)
Age: 21
End: 2021-10-11

## 2021-10-15 ENCOUNTER — DOCUMENTATION ONLY (OUTPATIENT)
Dept: FAMILY MEDICINE | Facility: CLINIC | Age: 21
End: 2021-10-15

## 2021-10-15 DIAGNOSIS — F43.9 STRESS: Primary | ICD-10-CM

## 2021-10-22 NOTE — PROGRESS NOTES
[facility] ATHLETIC MEDICINE  [facility]   Sport Psychology Progress Note      Location of Visit: Session conducted via telehealth. Clarice reports that her physical location is: [address] [place], [place]  Date of Visit: October 1, 2021  Duration of Session: 45 minutes    Suicide Assessment:  Clarice denies current urges to self-harm, suicidal ideation, means, plan, or intent.    Mental Status & Observations:  Clarice appeared generally alert and oriented. Dress was appropriate to the weather and occasion. Grooming and hygiene were appropriate. Eye contact was good. Speech was of normal volume and normal. Mood was appropriate with congruent affect. Thought processes were relevant, logical and goal-directed. Thought content was within normal limits with no evidence of psychotic or paranoid features. Memory appeared intact. Insight and judgment appeared age appropriate with good focus in session.  She exhibited normal motor activity during the appointment.  Behavior was candid and cooperative.      Observations and response to counseling:  Clarice arrived to session on time and was actively engaged throughout. Displayed mood and affect that was congruent to content discussed.    Intervention:  Empathetic listening and supportive counseling offered throughout.    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 Discussed academic progress and time management.    Discussed mental and physical preparation for [employer] meet schedule this afternoon. Clarice reports feeling more comfortable with her sport performance.     Disclosed increased stress regarding her mother and possible breast cancer diagnosis. Clarice is waiting to hear from her mother about the results of a recent biopsy. Offered support re: being away from family while waiting for emotionally distressing medical information. Processed ways to manage her emotions and seek support.     Themes explored: distress tolerance, uncontrollable's, and emotion  management.     Also discussed resolved conflict with a roommate, healthy outlets for anger, and ways to support friends with mental health challenges.    Therapy objectives/goals:  Provide support    Therapy follow-up plan:  Individual counseling sessions as needed.      Paula Watson PsyD

## 2021-10-25 NOTE — PROGRESS NOTES
[facility] ATHLETIC MEDICINE  [facility]   Sport Psychology Progress Note      Location of Visit: Session conducted via telehealth. Clarice reports that her physical location is: [address] [place], [place]  Date of Visit: October 15, 2021  Duration of Session: 45 minutes    Suicide Assessment:  Clarice denies current urges to self-harm, suicidal ideation, means, plan, or intent.    Mental Status & Observations:  Clarice appeared generally alert and oriented. Dress was appropriate to the weather and occasion. Grooming and hygiene were appropriate. Eye contact was good. Speech was of normal volume and normal. Mood was appropriate with congruent affect. Thought processes were relevant, logical and goal-directed. Thought content was within normal limits with no evidence of psychotic or paranoid features. Memory appeared intact. Insight and judgment appeared age appropriate with good focus in session.  She exhibited normal motor activity during the appointment.  Behavior was candid and cooperative.      Observations and response to counseling:  Clarice arrived to session on time and was actively engaged throughout. Displayed mood and affect that was congruent to content discussed.    Intervention:  Empathetic listening and supportive counseling offered throughout.    Discussed recent conflict with roommates. Noted that she was able to direct and firm with them to resolve conflict. Signed a lease for residence for next year. Reports decrease stress now that this is settled.    Planning to spend time with friends this weekend.    Continued conversation about sport performance. 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 Recognizes increase physical soreness. Noted intent to be more present and aware during practice. Reports some nervousness for upcoming meet.    Discussed continued interpersonal concerns with a friend. Reviewed boundary setting. Encouraged Clarice to practice gratitude and self compassion.     Disclosed that  her mother's health results were negative for cancer cells. Noted plan to spend time with her mother soon.    Therapy objectives/goals:  Provide support    Therapy follow-up plan:  Individual counseling sessions as needed.      Paula Watson PsyD

## 2021-10-29 ENCOUNTER — DOCUMENTATION ONLY (OUTPATIENT)
Dept: FAMILY MEDICINE | Facility: CLINIC | Age: 21
End: 2021-10-29
Payer: COMMERCIAL

## 2021-10-29 DIAGNOSIS — F43.9 STRESS: Primary | ICD-10-CM

## 2021-10-31 NOTE — PROGRESS NOTES
[facility] ATHLETIC MEDICINE  [facility]   Sport Psychology Progress Note      Location of Visit: Session conducted via telehealth. Clarice reports that her physical location is: [address] [place], [place]  Date of Visit: October 29, 2021  Duration of Session: 45 minutes    Suicide Assessment:  Clarice denies current urges to self-harm, suicidal ideation, means, plan, or intent.    Mental Status & Observations:  Clarice appeared generally alert and oriented. Dress was appropriate to the weather and occasion. Grooming and hygiene were appropriate. Eye contact was good. Speech was of normal volume and normal. Mood was appropriate with congruent affect. Thought processes were relevant, logical and goal-directed. Thought content was within normal limits with no evidence of psychotic or paranoid features. Memory appeared intact. Insight and judgment appeared age appropriate with good focus in session.  She exhibited normal motor activity during the appointment.  Behavior was candid and cooperative.      Observations and response to counseling:  Clarice arrived to session on time and was actively engaged throughout. Displayed mood and affect that was congruent to content discussed.    Intervention:  Empathetic listening and supportive counseling offered throughout.    Reports that practice went well this morning. Discussed upcoming meet tonight and tomorrow. Clarice noted that she felt good mentally and physically about her sport performance.    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    Clarice reports that she has been successfully creating boundaries that meet her needs and has communicated clearly with roommates re: conflict.     Explored progress made re: self awareness and stress/anxiety management.     Therapy objectives/goals:  Provide support    Therapy follow-up plan:  Individual counseling  sessions as needed.      Paula Watson PsyD

## 2021-11-24 ENCOUNTER — OFFICE VISIT (OUTPATIENT)
Dept: ORTHOPEDICS | Facility: CLINIC | Age: 21
End: 2021-11-24
Payer: COMMERCIAL

## 2021-11-24 VITALS
HEIGHT: 72 IN | DIASTOLIC BLOOD PRESSURE: 71 MMHG | BODY MASS INDEX: 19.59 KG/M2 | HEART RATE: 109 BPM | SYSTOLIC BLOOD PRESSURE: 117 MMHG | WEIGHT: 144.6 LBS

## 2021-11-24 DIAGNOSIS — J01.10 SUBACUTE FRONTAL SINUSITIS: Primary | ICD-10-CM

## 2021-11-24 ASSESSMENT — MIFFLIN-ST. JEOR: SCORE: 1537.9

## 2021-11-24 NOTE — LETTER
11/24/2021      RE: Clarice Flores  [address] [place] [address]       CHIEF COMPLAINT:  No chief complaint on file.       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  Clarice has been experiencing congestion, sore throat, and generalized fatigue for the past few weeks, this has been getting worse as of this past Sunday.  She feels that her congestion is much more prominent.  This has not gone away with rest and analgesics.  She denies any fever, she is experiencing a dry cough at times, especially when she lays down.        Additional history: as documented    MEDICAL HISTORY  There is no problem list on file for this patient.      Current Outpatient Medications   Medication Sig Dispense Refill     albuterol (PROAIR HFA/PROVENTIL HFA/VENTOLIN HFA) 108 (90 Base) MCG/ACT inhaler Inhale 2 puffs into the lungs every 4 hours as needed for shortness of breath / dyspnea or wheezing (and 15 minutes before exercise) 1 Inhaler 11     amoxicillin-clavulanate (AUGMENTIN) 875-125 MG tablet Take 1 tablet by mouth 2 times daily for 10 days 20 tablet 0     montelukast (SINGULAIR) 10 MG tablet Take 1 tablet (10 mg) by mouth every evening 30 tablet 11     norgestimate-ethinyl estradiol (ORTHO-CYCLEN) 0.25-35 MG-MCG tablet Take 1 tablet by mouth daily       budesonide-formoterol (SYMBICORT) 160-4.5 MCG/ACT Inhaler Inhale 2 puffs into the lungs 2 times daily (Patient not taking: Reported on 11/24/2021) 10.2 g 11     Fluticasone-Umeclidin-Vilanterol (TRELEGY ELLIPTA) 200-62.5-25 MCG/INH oral inhaler Inhale 1 puff into the lungs daily Rinse mouth after use (Patient not taking: Reported on 11/24/2021) 1 each 11     meloxicam (MOBIC) 15 MG tablet Take 1 tablet (15 mg) by mouth daily (Patient not taking: Reported on 11/24/2021) 30 tablet 1       No Known Allergies    No family history on file.    Additional medical/Social/Surgical histories reviewed in EPIC and updated as  appropriate.        PHYSICAL EXAM    Vitals:    11/24/21 0833   BP: 117/71   Pulse: 109   Weight: 65.6 kg (144 lb 9.6 oz)   Height: 1.829 m (6')     Physical Exam  Vitals reviewed. Exam conducted with a chaperone present.   Constitutional:       Appearance: Normal appearance.   HENT:      Nose: Congestion present.      Right Sinus: Maxillary sinus tenderness present.      Left Sinus: Maxillary sinus tenderness present.      Mouth/Throat:      Mouth: Mucous membranes are moist.      Pharynx: Posterior oropharyngeal erythema present. No oropharyngeal exudate.   Eyes:      Conjunctiva/sclera: Conjunctivae normal.   Cardiovascular:      Rate and Rhythm: Normal rate and regular rhythm.      Pulses: Normal pulses.      Heart sounds: Normal heart sounds.   Pulmonary:      Effort: Pulmonary effort is normal.      Breath sounds: Normal breath sounds.   Lymphadenopathy:      Cervical: Cervical adenopathy present.   Neurological:      Mental Status: She is alert.              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    Clarice does have clinical evidence of sinusitis.  Given the duration of her symptoms I do think treatment is warranted, I am prescribing Augmentin for 10 days.  I am also encouraging her to use Flonase on an at least daily basis, possibly twice a day.  She should also use ibuprofen and maintain her hydration.    It was a pleasure seeing Clarice today.    Jc Becerril DO, CAM  Primary Care Sports Medicine      This note was constructed using Dragon dictation software, please excuse any minor errors in spelling, grammar, or syntax.        Jc Becerril DO

## 2021-11-24 NOTE — PROGRESS NOTES
CHIEF COMPLAINT:  No chief complaint on file.       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  Clarice has been experiencing congestion, sore throat, and generalized fatigue for the past few weeks, this has been getting worse as of this past Sunday.  She feels that her congestion is much more prominent.  This has not gone away with rest and analgesics.  She denies any fever, she is experiencing a dry cough at times, especially when she lays down.        Additional history: as documented    MEDICAL HISTORY  There is no problem list on file for this patient.      Current Outpatient Medications   Medication Sig Dispense Refill     albuterol (PROAIR HFA/PROVENTIL HFA/VENTOLIN HFA) 108 (90 Base) MCG/ACT inhaler Inhale 2 puffs into the lungs every 4 hours as needed for shortness of breath / dyspnea or wheezing (and 15 minutes before exercise) 1 Inhaler 11     amoxicillin-clavulanate (AUGMENTIN) 875-125 MG tablet Take 1 tablet by mouth 2 times daily for 10 days 20 tablet 0     montelukast (SINGULAIR) 10 MG tablet Take 1 tablet (10 mg) by mouth every evening 30 tablet 11     norgestimate-ethinyl estradiol (ORTHO-CYCLEN) 0.25-35 MG-MCG tablet Take 1 tablet by mouth daily       budesonide-formoterol (SYMBICORT) 160-4.5 MCG/ACT Inhaler Inhale 2 puffs into the lungs 2 times daily (Patient not taking: Reported on 11/24/2021) 10.2 g 11     Fluticasone-Umeclidin-Vilanterol (TRELEGY ELLIPTA) 200-62.5-25 MCG/INH oral inhaler Inhale 1 puff into the lungs daily Rinse mouth after use (Patient not taking: Reported on 11/24/2021) 1 each 11     meloxicam (MOBIC) 15 MG tablet Take 1 tablet (15 mg) by mouth daily (Patient not taking: Reported on 11/24/2021) 30 tablet 1       No Known Allergies    No family history on file.    Additional medical/Social/Surgical histories reviewed in [facility] and updated as appropriate.        PHYSICAL EXAM    Vitals:    11/24/21 0833   BP: 117/71    Pulse: 109   Weight: 65.6 kg (144 lb 9.6 oz)   Height: 1.829 m (6')     Physical Exam  Vitals reviewed. Exam conducted with a chaperone present.   Constitutional:       Appearance: Normal appearance.   HENT:      Nose: Congestion present.      Right Sinus: Maxillary sinus tenderness present.      Left Sinus: Maxillary sinus tenderness present.      Mouth/Throat:      Mouth: Mucous membranes are moist.      Pharynx: Posterior oropharyngeal erythema present. No oropharyngeal exudate.   Eyes:      Conjunctiva/sclera: Conjunctivae normal.   Cardiovascular:      Rate and Rhythm: Normal rate and regular rhythm.      Pulses: Normal pulses.      Heart sounds: Normal heart sounds.   Pulmonary:      Effort: Pulmonary effort is normal.      Breath sounds: Normal breath sounds.   Lymphadenopathy:      Cervical: Cervical adenopathy present.   Neurological:      Mental Status: She is alert.              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    Clarice does have clinical evidence of sinusitis.  Given the duration of her symptoms I do think treatment is warranted, I am prescribing Augmentin for 10 days.  I am also encouraging her to use Flonase on an at least daily basis, possibly twice a day.  She should also use ibuprofen and maintain her hydration.    It was a pleasure seeing Clarice today.    Jc Becerril DO, CAM  Primary Care Sports Medicine      This note was constructed using Dragon dictation software, please excuse any minor errors in spelling, grammar, or syntax.

## 2021-12-01 ENCOUNTER — OFFICE VISIT (OUTPATIENT)
Dept: ORTHOPEDICS | Facility: CLINIC | Age: 21
End: 2021-12-01
Payer: COMMERCIAL

## 2021-12-01 VITALS
WEIGHT: 150 LBS | BODY MASS INDEX: 20.32 KG/M2 | SYSTOLIC BLOOD PRESSURE: 128 MMHG | DIASTOLIC BLOOD PRESSURE: 79 MMHG | HEIGHT: 72 IN | HEART RATE: 91 BPM

## 2021-12-01 DIAGNOSIS — R55 SYNCOPE AND COLLAPSE: Primary | ICD-10-CM

## 2021-12-01 ASSESSMENT — MIFFLIN-ST. JEOR: SCORE: 1562.4

## 2021-12-01 NOTE — LETTER
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  Clarice was doing shoulder rehab exercises in the training room 2 days ago when she suddenly got sweaty and had an episode of syncope.  This was witnessed by Thomas vidal, our physical therapist who was running the study that she is a part of.  Clarice does report having a good breakfast that morning and being well-hydrated.  She felt no prodrome symptoms such as chest pain, heart racing, headache, or any other notable symptoms.  She came to quickly, she remembers the events, she went home and went to bed for a couple of hours.  She felt off for the rest of the day but resumed training the next day.  Fortunately she feels like her normal self as of yesterday.    Of note she did have an episode of syncope 2 years ago, this was during recovery from a concussion, this seemed different to her.      Additional history: as documented    MEDICAL HISTORY  There is no problem list on file for this patient.      Current Outpatient Medications   Medication Sig Dispense Refill     albuterol (PROAIR HFA/PROVENTIL HFA/VENTOLIN HFA) 108 (90 Base) MCG/ACT inhaler Inhale 2 puffs into the lungs every 4 hours as needed for shortness of breath / dyspnea or wheezing (and 15 minutes before exercise) 1 Inhaler 11     amoxicillin-clavulanate (AUGMENTIN) 875-125 MG tablet Take 1 tablet by mouth 2 times daily for 10 days 20 tablet 0     montelukast (SINGULAIR) 10 MG tablet Take 1 tablet (10 mg) by mouth every evening 30 tablet 11     norgestimate-ethinyl estradiol (ORTHO-CYCLEN) 0.25-35 MG-MCG tablet Take 1 tablet by mouth daily       budesonide-formoterol (SYMBICORT) 160-4.5 MCG/ACT Inhaler Inhale 2 puffs into the lungs 2 times daily (Patient not taking: Reported on 11/24/2021) 10.2 g 11     Fluticasone-Umeclidin-Vilanterol (TRELEGY  ELLIPTA) 200-62.5-25 MCG/INH oral inhaler Inhale 1 puff into the lungs daily Rinse mouth after use (Patient not taking: Reported on 11/24/2021) 1 each 11     meloxicam (MOBIC) 15 MG tablet Take 1 tablet (15 mg) by mouth daily (Patient not taking: Reported on 11/24/2021) 30 tablet 1       No Known Allergies    No family history on file.    Additional medical/Social/Surgical histories reviewed in [facility] and updated as appropriate.        PHYSICAL EXAM    Vitals:    12/01/21 0820   BP: 128/79   Pulse: 91   Weight: 68 kg (150 lb)   Height: 1.829 m (6')     Physical Exam  Vitals reviewed. Exam conducted with a chaperone present.   Constitutional:       Appearance: Normal appearance.   Eyes:      Conjunctiva/sclera: Conjunctivae normal.   Cardiovascular:      Rate and Rhythm: Normal rate and regular rhythm.      Pulses: Normal pulses.      Heart sounds: Murmur heard.       Pulmonary:      Effort: Pulmonary effort is normal.      Breath sounds: Normal breath sounds.   Skin:     Capillary Refill: Capillary refill takes less than 2 seconds.   Neurological:      General: No focal deficit present.      Mental Status: She is alert. Mental status is at baseline.   Psychiatric:         Mood and Affect: Mood normal.         Behavior: Behavior normal.              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    Clarice and I had a lengthy recent episode of syncope.  We discussed metabolic causes, discussion centering on likely etiologies for her cardiac and neurogenic causes, as well as external causes.    She has had a reassuring EKG, echo, and cardiac MRI in the past.  Given that this is her second bout of syncope I am referring her to cardiology to discuss potential etiologies further.  We also discussed short-term heart rate monitoring, although given her lack of symptomatology we are choosing to forego this at the moment.    I am ordering lab work that she can get done at her  earliest convenience, I will get in touch with her and her  with the results.    In the meantime I do think it is okay for her to train, she has no symptoms while training and does feel back to baseline.  She does know to keep a close eye on her symptoms, if her symptoms return she is going to report them immediately.    It was a pleasure seeing Clarice today.    Jc Becerril DO, [facility]  Primary Care Sports Medicine      This note was constructed using Dragon dictation software, please excuse any minor errors in spelling, grammar, or syntax.        Jc Becerril DO

## 2021-12-01 NOTE — PROGRESS NOTES
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  Clarice was doing shoulder rehab exercises in the training room 2 days ago when she suddenly got sweaty and had an episode of syncope.  This was witnessed by Thomas vidal, our physical therapist who was running the study that she is a part of.  Clarice does report having a good breakfast that morning and being well-hydrated.  She felt no prodrome symptoms such as chest pain, heart racing, headache, or any other notable symptoms.  She came to quickly, she remembers the events, she went home and went to bed for a couple of hours.  She felt off for the rest of the day but resumed training the next day.  Fortunately she feels like her normal self as of yesterday.    Of note she did have an episode of syncope 2 years ago, this was during recovery from a concussion, this seemed different to her.      Additional history: as documented    MEDICAL HISTORY  There is no problem list on file for this patient.      Current Outpatient Medications   Medication Sig Dispense Refill     albuterol (PROAIR HFA/PROVENTIL HFA/VENTOLIN HFA) 108 (90 Base) MCG/ACT inhaler Inhale 2 puffs into the lungs every 4 hours as needed for shortness of breath / dyspnea or wheezing (and 15 minutes before exercise) 1 Inhaler 11     amoxicillin-clavulanate (AUGMENTIN) 875-125 MG tablet Take 1 tablet by mouth 2 times daily for 10 days 20 tablet 0     montelukast (SINGULAIR) 10 MG tablet Take 1 tablet (10 mg) by mouth every evening 30 tablet 11     norgestimate-ethinyl estradiol (ORTHO-CYCLEN) 0.25-35 MG-MCG tablet Take 1 tablet by mouth daily       budesonide-formoterol (SYMBICORT) 160-4.5 MCG/ACT Inhaler Inhale 2 puffs into the lungs 2 times daily (Patient not taking: Reported on 11/24/2021) 10.2 g 11     Fluticasone-Umeclidin-Vilanterol (TRELEGY ELLIPTA) 200-62.5-25 MCG/INH oral inhaler Inhale 1 puff into the lungs daily  Rinse mouth after use (Patient not taking: Reported on 11/24/2021) 1 each 11     meloxicam (MOBIC) 15 MG tablet Take 1 tablet (15 mg) by mouth daily (Patient not taking: Reported on 11/24/2021) 30 tablet 1       No Known Allergies    No family history on file.    Additional medical/Social/Surgical histories reviewed in [facility] and updated as appropriate.        PHYSICAL EXAM    Vitals:    12/01/21 0820   BP: 128/79   Pulse: 91   Weight: 68 kg (150 lb)   Height: 1.829 m (6')     Physical Exam  Vitals reviewed. Exam conducted with a chaperone present.   Constitutional:       Appearance: Normal appearance.   Eyes:      Conjunctiva/sclera: Conjunctivae normal.   Cardiovascular:      Rate and Rhythm: Normal rate and regular rhythm.      Pulses: Normal pulses.      Heart sounds: Murmur heard.       Pulmonary:      Effort: Pulmonary effort is normal.      Breath sounds: Normal breath sounds.   Skin:     Capillary Refill: Capillary refill takes less than 2 seconds.   Neurological:      General: No focal deficit present.      Mental Status: She is alert. Mental status is at baseline.   Psychiatric:         Mood and Affect: Mood normal.         Behavior: Behavior normal.              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    Clarice and I had a lengthy recent episode of syncope.  We discussed metabolic causes, discussion centering on likely etiologies for her cardiac and neurogenic causes, as well as external causes.    She has had a reassuring EKG, echo, and cardiac MRI in the past.  Given that this is her second bout of syncope I am referring her to cardiology to discuss potential etiologies further.  We also discussed short-term heart rate monitoring, although given her lack of symptomatology we are choosing to forego this at the moment.    I am ordering lab work that she can get done at her earliest convenience, I will get in touch with her and her   with the results.    In the meantime I do think it is okay for her to train, she has no symptoms while training and does feel back to baseline.  She does know to keep a close eye on her symptoms, if her symptoms return she is going to report them immediately.    It was a pleasure seeing Clarice today.    Jc Becerril DO, [facility]  Primary Care Sports Medicine      This note was constructed using Dragon dictation software, please excuse any minor errors in spelling, grammar, or syntax.

## 2021-12-05 ENCOUNTER — HEALTH MAINTENANCE LETTER (OUTPATIENT)
Age: 21
End: 2021-12-05

## 2021-12-21 ENCOUNTER — ANCILLARY PROCEDURE (OUTPATIENT)
Dept: GENERAL RADIOLOGY | Facility: CLINIC | Age: 21
End: 2021-12-21
Attending: PREVENTIVE MEDICINE
Payer: COMMERCIAL

## 2021-12-21 DIAGNOSIS — L03.119 CELLULITIS AND ABSCESS OF LEG, EXCEPT FOOT: Primary | ICD-10-CM

## 2021-12-21 DIAGNOSIS — M25.572 PAIN OF JOINT OF LEFT ANKLE AND FOOT: ICD-10-CM

## 2021-12-21 DIAGNOSIS — L02.419 CELLULITIS AND ABSCESS OF LEG, EXCEPT FOOT: Primary | ICD-10-CM

## 2021-12-21 DIAGNOSIS — M77.50 FOOT TENDINITIS: ICD-10-CM

## 2021-12-21 DIAGNOSIS — L03.119 CELLULITIS AND ABSCESS OF FOOT: ICD-10-CM

## 2021-12-21 DIAGNOSIS — L02.619 CELLULITIS AND ABSCESS OF FOOT: ICD-10-CM

## 2021-12-21 PROCEDURE — 73630 X-RAY EXAM OF FOOT: CPT | Mod: LT | Performed by: RADIOLOGY

## 2021-12-21 RX ORDER — PREDNISONE 20 MG/1
40 TABLET ORAL DAILY
Qty: 10 TABLET | Refills: 0 | Status: SHIPPED | OUTPATIENT
Start: 2021-12-21 | End: 2021-12-29

## 2021-12-21 RX ORDER — SULFAMETHOXAZOLE/TRIMETHOPRIM 800-160 MG
1 TABLET ORAL 2 TIMES DAILY
Qty: 14 TABLET | Refills: 0 | Status: SHIPPED | OUTPATIENT
Start: 2021-12-21 | End: 2021-12-29

## 2021-12-27 ENCOUNTER — TELEPHONE (OUTPATIENT)
Dept: FAMILY MEDICINE | Facility: CLINIC | Age: 21
End: 2021-12-27
Payer: COMMERCIAL

## 2021-12-27 ENCOUNTER — LAB (OUTPATIENT)
Dept: LAB | Facility: CLINIC | Age: 21
End: 2021-12-27
Payer: COMMERCIAL

## 2021-12-27 DIAGNOSIS — B99.9 LOCALIZED INFECTION: ICD-10-CM

## 2021-12-27 DIAGNOSIS — B99.9 LOCALIZED INFECTION: Primary | ICD-10-CM

## 2021-12-27 DIAGNOSIS — L03.116 CELLULITIS OF LEFT LOWER EXTREMITY: Primary | ICD-10-CM

## 2021-12-27 LAB
ANION GAP SERPL CALCULATED.3IONS-SCNC: 8 MMOL/L (ref 3–14)
BASOPHILS # BLD MANUAL: 0.1 10E3/UL (ref 0–0.2)
BASOPHILS NFR BLD MANUAL: 1 %
BUN SERPL-MCNC: 18 MG/DL (ref 7–30)
CALCIUM SERPL-MCNC: 8.2 MG/DL (ref 8.5–10.1)
CHLORIDE BLD-SCNC: 106 MMOL/L (ref 94–109)
CO2 SERPL-SCNC: 26 MMOL/L (ref 20–32)
CREAT SERPL-MCNC: 0.92 MG/DL (ref 0.52–1.04)
CRP SERPL-MCNC: 29.9 MG/L (ref 0–8)
EOSINOPHIL # BLD MANUAL: 0.1 10E3/UL (ref 0–0.7)
EOSINOPHIL NFR BLD MANUAL: 1 %
ERYTHROCYTE [DISTWIDTH] IN BLOOD BY AUTOMATED COUNT: 12.5 % (ref 10–15)
ERYTHROCYTE [SEDIMENTATION RATE] IN BLOOD BY WESTERGREN METHOD: 10 MM/HR (ref 0–20)
GFR SERPL CREATININE-BSD FRML MDRD: >90 ML/MIN/1.73M2
GLUCOSE BLD-MCNC: 84 MG/DL (ref 70–99)
HCT VFR BLD AUTO: 40.9 % (ref 35–47)
HGB BLD-MCNC: 13.4 G/DL (ref 11.7–15.7)
LYMPHOCYTES # BLD MANUAL: 2.1 10E3/UL (ref 0.8–5.3)
LYMPHOCYTES NFR BLD MANUAL: 18 %
MCH RBC QN AUTO: 30.3 PG (ref 26.5–33)
MCHC RBC AUTO-ENTMCNC: 32.8 G/DL (ref 31.5–36.5)
MCV RBC AUTO: 93 FL (ref 78–100)
METAMYELOCYTES # BLD MANUAL: 0.1 10E3/UL
METAMYELOCYTES NFR BLD MANUAL: 1 %
MONOCYTES # BLD MANUAL: 1 10E3/UL (ref 0–1.3)
MONOCYTES NFR BLD MANUAL: 9 %
MYELOCYTES # BLD MANUAL: 0.5 10E3/UL
MYELOCYTES NFR BLD MANUAL: 4 %
NEUTROPHILS # BLD MANUAL: 7.7 10E3/UL (ref 1.6–8.3)
NEUTROPHILS NFR BLD MANUAL: 66 %
PLAT MORPH BLD: ABNORMAL
PLATELET # BLD AUTO: 386 10E3/UL (ref 150–450)
POTASSIUM BLD-SCNC: 3.9 MMOL/L (ref 3.4–5.3)
RBC # BLD AUTO: 4.42 10E6/UL (ref 3.8–5.2)
RBC MORPH BLD: ABNORMAL
SODIUM SERPL-SCNC: 140 MMOL/L (ref 133–144)
WBC # BLD AUTO: 11.6 10E3/UL (ref 4–11)

## 2021-12-27 PROCEDURE — 85652 RBC SED RATE AUTOMATED: CPT | Performed by: PATHOLOGY

## 2021-12-27 PROCEDURE — 85027 COMPLETE CBC AUTOMATED: CPT | Performed by: PATHOLOGY

## 2021-12-27 PROCEDURE — 86140 C-REACTIVE PROTEIN: CPT | Performed by: PATHOLOGY

## 2021-12-27 PROCEDURE — 80048 BASIC METABOLIC PNL TOTAL CA: CPT | Performed by: PATHOLOGY

## 2021-12-27 PROCEDURE — 36415 COLL VENOUS BLD VENIPUNCTURE: CPT | Performed by: PATHOLOGY

## 2021-12-28 RX ORDER — CLINDAMYCIN HCL 300 MG
300 CAPSULE ORAL 4 TIMES DAILY
Qty: 28 CAPSULE | Refills: 0 | Status: SHIPPED | OUTPATIENT
Start: 2021-12-28 | End: 2022-01-04

## 2021-12-29 ENCOUNTER — HOSPITAL ENCOUNTER (EMERGENCY)
Facility: CLINIC | Age: 21
Discharge: HOME | End: 2021-12-29
Attending: EMERGENCY MEDICINE | Admitting: EMERGENCY MEDICINE
Payer: COMMERCIAL

## 2021-12-29 VITALS
BODY MASS INDEX: 20.32 KG/M2 | RESPIRATION RATE: 17 BRPM | SYSTOLIC BLOOD PRESSURE: 120 MMHG | WEIGHT: 150 LBS | OXYGEN SATURATION: 97 % | HEART RATE: 66 BPM | HEIGHT: 72 IN | TEMPERATURE: 98.9 F | DIASTOLIC BLOOD PRESSURE: 60 MMHG

## 2021-12-29 DIAGNOSIS — L02.612 ABSCESS OF LEFT FOOT: ICD-10-CM

## 2021-12-29 LAB
ANION GAP SERPL CALCULATED.3IONS-SCNC: 7 MMOL/L (ref 3–14)
BASOPHILS # BLD AUTO: 0.1 10E3/UL (ref 0–0.2)
BASOPHILS NFR BLD AUTO: 1 %
BUN SERPL-MCNC: 10 MG/DL (ref 7–30)
CALCIUM SERPL-MCNC: 9.3 MG/DL (ref 8.5–10.1)
CHLORIDE BLD-SCNC: 105 MMOL/L (ref 94–109)
CO2 SERPL-SCNC: 27 MMOL/L (ref 20–32)
CREAT SERPL-MCNC: 0.63 MG/DL (ref 0.52–1.04)
EOSINOPHIL # BLD AUTO: 0 10E3/UL (ref 0–0.7)
EOSINOPHIL NFR BLD AUTO: 0 %
ERYTHROCYTE [DISTWIDTH] IN BLOOD BY AUTOMATED COUNT: 12.6 % (ref 10–15)
GFR SERPL CREATININE-BSD FRML MDRD: >90 ML/MIN/1.73M2
GLUCOSE BLD-MCNC: 81 MG/DL (ref 70–99)
HCT VFR BLD AUTO: 39.9 % (ref 35–47)
HGB BLD-MCNC: 13.1 G/DL (ref 11.7–15.7)
IMM GRANULOCYTES # BLD: 0.2 10E3/UL
IMM GRANULOCYTES NFR BLD: 2 %
LYMPHOCYTES # BLD AUTO: 2.1 10E3/UL (ref 0.8–5.3)
LYMPHOCYTES NFR BLD AUTO: 21 %
MCH RBC QN AUTO: 30.9 PG (ref 26.5–33)
MCHC RBC AUTO-ENTMCNC: 32.8 G/DL (ref 31.5–36.5)
MCV RBC AUTO: 94 FL (ref 78–100)
MONOCYTES # BLD AUTO: 0.9 10E3/UL (ref 0–1.3)
MONOCYTES NFR BLD AUTO: 9 %
NEUTROPHILS # BLD AUTO: 6.8 10E3/UL (ref 1.6–8.3)
NEUTROPHILS NFR BLD AUTO: 67 %
NRBC # BLD AUTO: 0 10E3/UL
NRBC BLD AUTO-RTO: 0 /100
PLATELET # BLD AUTO: 358 10E3/UL (ref 150–450)
POTASSIUM BLD-SCNC: 4.4 MMOL/L (ref 3.4–5.3)
RBC # BLD AUTO: 4.24 10E6/UL (ref 3.8–5.2)
SODIUM SERPL-SCNC: 139 MMOL/L (ref 133–144)
WBC # BLD AUTO: 10 10E3/UL (ref 4–11)

## 2021-12-29 PROCEDURE — 10060 I&D ABSCESS SIMPLE/SINGLE: CPT | Performed by: EMERGENCY MEDICINE

## 2021-12-29 PROCEDURE — 99283 EMERGENCY DEPT VISIT LOW MDM: CPT | Mod: 25 | Performed by: EMERGENCY MEDICINE

## 2021-12-29 PROCEDURE — 99284 EMERGENCY DEPT VISIT MOD MDM: CPT | Mod: 25 | Performed by: EMERGENCY MEDICINE

## 2021-12-29 PROCEDURE — 80048 BASIC METABOLIC PNL TOTAL CA: CPT | Performed by: EMERGENCY MEDICINE

## 2021-12-29 PROCEDURE — 36415 COLL VENOUS BLD VENIPUNCTURE: CPT | Performed by: EMERGENCY MEDICINE

## 2021-12-29 PROCEDURE — 85025 COMPLETE CBC W/AUTO DIFF WBC: CPT | Performed by: EMERGENCY MEDICINE

## 2021-12-29 ASSESSMENT — MIFFLIN-ST. JEOR: SCORE: 1562.4

## 2021-12-29 NOTE — DISCHARGE INSTRUCTIONS
Please make an appointment to follow up with Your Primary Care Provider as soon as possible as needed.  Continue the clindamycin.

## 2021-12-29 NOTE — ED TRIAGE NOTES
Presents with worsening swelling, redness & pain to top of left foot. Patient states she noticed swelling/pain start about a week ago, was seen at clinic 12/21 and got an x-ray and started on Clindamycin. Patient reports the symptoms continue to worsen and is here for a further workup.

## 2021-12-29 NOTE — ED PROVIDER NOTES
[facility] EMERGENCY DEPARTMENT ([facility])  12/29/21  History     Chief Complaint   Patient presents with     Foot Pain     left     The history is provided by the patient and medical records.     Clarice Flores is a 21 year old female who presents to the Emergency Department for evaluation of worsening swelling, redness, and pain to the top of her left foot for the past week.  Patient swims at the [facility] and has been followed by her sports medicine physician for management of her symptoms.  She was initially started on Bactrim and after completion of this course she was started on clindamycin yesterday.  She last took clindamycin at 10.  She endorses swelling and erythema covering her whole left foot.  The swelling and erythema has since become concentrated to one area on the top of her foot but has worsened in this area.  She denies drainage from the area.  She denies allergies.    Per review of the patient's medical record, she was seen in clinic on 12/21/2021 and received an x-ray which showed tissue swelling. She was started on clindamycin.    XRAY 3 VW LEFT FOOT 12/21/2021  Impression: Dorsal soft tissue swelling with relatively corresponding  level nonspecific mineralization projects plantar to the  tarsometatarsal joint on lateral view. If there is clinical concern of  Lisfranc injury, consider MRI for further evaluation.     Past Medical History:   Diagnosis Date     Concussion      Stress fracture        History reviewed. No pertinent surgical history.    History reviewed. No pertinent family history.    Social History     Tobacco Use     Smoking status: Never Smoker     Smokeless tobacco: Never Used   Substance Use Topics     Alcohol use: Never       Current Facility-Administered Medications   Medication     lidocaine 1 % 10 mL     Current Outpatient Medications   Medication     albuterol (PROAIR HFA/PROVENTIL HFA/VENTOLIN HFA) 108 (90 Base) MCG/ACT inhaler     clindamycin  (CLEOCIN) 300 MG capsule     norgestimate-ethinyl estradiol (ORTHO-CYCLEN) 0.25-35 MG-MCG tablet      No Known Allergies    I have reviewed the Medications, Allergies, Past Medical and Surgical History, and Social History in the Epic system.    Review of Systems  A complete review of systems was performed with pertinent positives and negatives noted in the HPI, and all other systems negative.    Physical Exam   BP: 124/65  Pulse: 66  Temp: 98.9  F (37.2  C)  Resp: 17  Height: 182.9 cm (6')  Weight: 68 kg (150 lb)  SpO2: 97 %      Physical Exam  Constitutional:       General: She is not in acute distress.     Appearance: Normal appearance.   HENT:      Head: Normocephalic and atraumatic.      Nose: Nose normal.      Mouth/Throat:      Mouth: Mucous membranes are moist.   Eyes:      Extraocular Movements: Extraocular movements intact.      Pupils: Pupils are equal, round, and reactive to light.   Cardiovascular:      Rate and Rhythm: Normal rate and regular rhythm.      Pulses: Normal pulses.   Pulmonary:      Effort: Pulmonary effort is normal.      Breath sounds: Normal breath sounds.   Abdominal:      General: Abdomen is flat.      Tenderness: There is no abdominal tenderness. There is no guarding or rebound.   Musculoskeletal:         General: Normal range of motion.      Cervical back: Normal range of motion.      Comments: Patient has a signs of abscess with overlying cellulitic changes and warmth of the left foot over the first metatarsal.  There is palpable fluctuance.  There is no active drainage.  Edema to the midfoot.  No streaking neurovascularly intact   Neurological:      General: No focal deficit present.      Mental Status: She is alert and oriented to person, place, and time.       ED Course   1:57 PM  The patient was seen and examined by Silvio Alfredo MD in Room ED14.        [facility]    PROCEDURE: -Incision/Drainage    Date/Time: 12/29/2021 4:44  PM  Performed by: Silvio Alfredo MD  Authorized by: Silvio Alfredo MD     Risks, benefits and alternatives discussed.      LOCATION:      Type:  Abscess    Location:  Lower extremity    Lower extremity location:  Foot    Foot location:  L foot    PROCEDURE TYPE:     Complexity:  Simple    ANESTHESIA (see MAR for exact dosages):     Anesthesia method:  Local infiltration    Local anesthetic:  Lidocaine 1% WITH epi    PROCEDURE DETAILS:     Needle aspiration: no      Incision types:  Cruciate    Incision depth:  Dermal    Scalpel blade:  11    Wound management:  Probed and deloculated    Drainage:  Purulent and bloody    Drainage amount:  Moderate    Wound treatment:  Wound left open    PROCEDURE    Patient Tolerance:  Patient tolerated the procedure well with no immediate complications           Results for orders placed or performed during the hospital encounter of 12/29/21 (from the past 24 hour(s))   Basic metabolic panel   Result Value Ref Range    Sodium 139 133 - 144 mmol/L    Potassium 4.4 3.4 - 5.3 mmol/L    Chloride 105 94 - 109 mmol/L    Carbon Dioxide (CO2) 27 20 - 32 mmol/L    Anion Gap 7 3 - 14 mmol/L    Urea Nitrogen 10 7 - 30 mg/dL    Creatinine 0.63 0.52 - 1.04 mg/dL    Calcium 9.3 8.5 - 10.1 mg/dL    Glucose 81 70 - 99 mg/dL    GFR Estimate >90 >60 mL/min/1.73m2   CBC with platelets differential    Narrative    The following orders were created for panel order CBC with platelets differential.  Procedure                               Abnormality         Status                     ---------                               -----------         ------                     CBC with platelets and d...[[id number]]                      Final result                 Please view results for these tests on the individual orders.   CBC with platelets and differential   Result Value Ref Range    WBC Count 10.0 4.0 - 11.0 10e3/uL    RBC Count 4.24 3.80 - 5.20 10e6/uL    Hemoglobin 13.1 11.7 - 15.7 g/dL    Hematocrit 39.9  35.0 - 47.0 %    MCV 94 78 - 100 fL    MCH 30.9 26.5 - 33.0 pg    MCHC 32.8 31.5 - 36.5 g/dL    RDW 12.6 10.0 - 15.0 %    Platelet Count 358 150 - 450 10e3/uL    % Neutrophils 67 %    % Lymphocytes 21 %    % Monocytes 9 %    % Eosinophils 0 %    % Basophils 1 %    % Immature Granulocytes 2 %    NRBCs per 100 WBC 0 <1 /100    Absolute Neutrophils 6.8 1.6 - 8.3 10e3/uL    Absolute Lymphocytes 2.1 0.8 - 5.3 10e3/uL    Absolute Monocytes 0.9 0.0 - 1.3 10e3/uL    Absolute Eosinophils 0.0 0.0 - 0.7 10e3/uL    Absolute Basophils 0.1 0.0 - 0.2 10e3/uL    Absolute Immature Granulocytes 0.2 <=0.4 10e3/uL    Absolute NRBCs 0.0 10e3/uL     Medications   lidocaine 1 % 10 mL (has no administration in time range)             Assessments & Plan (with Medical Decision Making)   Patient has signs of focal swelling along with surrounding cellulitic changes and edema to the left foot.  Exam is consistent with abscess.  Bedside ultrasound shows a pocket of fluid which would be amenable to I&D.  Discussed this with the patient.  Local field anesthesia was performed with lidocaine.  Cruciate incision was performed with moderate amount of pus and blood and small amount of sebaceous material expressed.  Patient tolerated the procedure well.  Discussed follow-up with her sports physician and to continue the clindamycin.  Discussed signs and symptoms of recurrence or worsening infection to return such as streaking developing fevers or closure and recurrence of swelling.  Patient understands the plan and agrees and is stable for discharge and outpatient follow-up.    I have reviewed the nursing notes.    I have reviewed the findings, diagnosis, plan and need for follow up with the patient.    New Prescriptions    No medications on file       Final diagnoses:   Abscess of left foot       INaomi, am serving as a trained medical scribe to document services personally performed by Silvio Alfredo MD based on the provider's statements to  me on December 29, 2021.  This document has been checked and approved by the attending provider.    I, Silvio Alfredo MD, was physically present and have reviewed and verified the accuracy of this note documented by Naomi Elizabeth, medical scribe.      Silvio Alfredo MD  12/29/2021   [facility] EMERGENCY DEPARTMENT     Silvio Alfredo MD  12/29/21 [id number]

## 2021-12-30 ENCOUNTER — TELEPHONE (OUTPATIENT)
Dept: FAMILY MEDICINE | Facility: CLINIC | Age: 21
End: 2021-12-30
Payer: COMMERCIAL

## 2021-12-30 DIAGNOSIS — L02.619 CELLULITIS AND ABSCESS OF FOOT: Primary | ICD-10-CM

## 2021-12-30 DIAGNOSIS — M79.673 PAIN OF FOOT, UNSPECIFIED LATERALITY: Primary | ICD-10-CM

## 2021-12-30 DIAGNOSIS — L03.119 CELLULITIS AND ABSCESS OF FOOT: Primary | ICD-10-CM

## 2021-12-30 RX ORDER — TRAMADOL HYDROCHLORIDE 50 MG/1
50 TABLET ORAL
Qty: 6 TABLET | Refills: 0 | Status: SHIPPED | OUTPATIENT
Start: 2021-12-30 | End: 2022-01-05

## 2021-12-30 RX ORDER — DICLOFENAC SODIUM 75 MG/1
75 TABLET, DELAYED RELEASE ORAL 2 TIMES DAILY
Qty: 28 TABLET | Refills: 0 | Status: SHIPPED | OUTPATIENT
Start: 2021-12-30 | End: 2022-01-13

## 2022-01-03 ENCOUNTER — OFFICE VISIT (OUTPATIENT)
Dept: FAMILY MEDICINE | Facility: CLINIC | Age: 22
End: 2022-01-03
Payer: COMMERCIAL

## 2022-01-03 VITALS — HEIGHT: 72 IN | BODY MASS INDEX: 20.34 KG/M2

## 2022-01-03 DIAGNOSIS — L03.116 CELLULITIS OF LEFT LOWER EXTREMITY: Primary | ICD-10-CM

## 2022-01-03 NOTE — LETTER
Date:January 4, 2022      Patient was self referred, no letter generated. Do not send.        [facility] Health Information

## 2022-01-03 NOTE — LETTER
1/3/2022      RE: Clarice Flores  [address] [place] [address] [place] [address]       HISTORY OF PRESENT ILLNESS  Ms. Flores is a pleasant 21 year old year old female who presents to clinic today for followup for left foot cellulitis/abcess  Overall improved  Had I and D completed last week  And has almost completely resolved and healed  Continues to take antibiotics      MEDICAL HISTORY  There is no problem list on file for this patient.      Current Outpatient Medications   Medication Sig Dispense Refill     albuterol (PROAIR HFA/PROVENTIL HFA/VENTOLIN HFA) 108 (90 Base) MCG/ACT inhaler Inhale 2 puffs into the lungs every 4 hours as needed for shortness of breath / dyspnea or wheezing (and 15 minutes before exercise) 1 Inhaler 11     clindamycin (CLEOCIN) 300 MG capsule Take 1 capsule (300 mg) by mouth 4 times daily for 7 days 28 capsule 0     diclofenac (VOLTAREN) 75 MG EC tablet Take 1 tablet (75 mg) by mouth 2 times daily for 14 days 28 tablet 0     norgestimate-ethinyl estradiol (ORTHO-CYCLEN) 0.25-35 MG-MCG tablet Take 1 tablet by mouth daily       sulfamethoxazole-trimethoprim (BACTRIM DS) 800-160 MG tablet Take 1 tablet by mouth 2 times daily (Patient not taking: Reported on 1/3/2022) 10 tablet 0     traMADol (ULTRAM) 50 MG tablet Take 1 tablet (50 mg) by mouth nightly as needed for severe pain (Patient not taking: Reported on 1/3/2022) 6 tablet 0       No Known Allergies    No family history on file.  Social History     Socioeconomic History     Marital status: Single     Spouse name: Not on file     Number of children: Not on file     Years of education: Not on file     Highest education level: Not on file   Occupational History     Not on file   Tobacco Use     Smoking status: Never Smoker     Smokeless tobacco: Never Used   Substance and Sexual Activity     Alcohol use: Never     Drug use: Never     Sexual activity: Never   Other Topics Concern     Not on file   Social History Narrative     Not on file     Social  Determinants of Health     Financial Resource Strain: Not on file   Food Insecurity: Not on file   Transportation Needs: Not on file   Physical Activity: Not on file   Stress: Not on file   Social Connections: Not on file   Intimate Partner Violence: Not on file   Housing Stability: Not on file       Additional medical/Social/Surgical histories reviewed in [facility] and updated as appropriate.     REVIEW OF SYSTEMS (1/3/2022)  10 point ROS of systems including Constitutional, Eyes, Respiratory, Cardiovascular, Gastroenterology, Genitourinary, Integumentary, Musculoskeletal, Psychiatric, Allergic/Immunologic were all negative except for pertinent positives noted in my HPI.     PHYSICAL EXAM  Vitals:    01/03/22 1527   Height: 1.829 m (6')     Vital Signs: Ht 1.829 m (6')   LMP 12/29/2021   BMI 20.34 kg/m   Patient declined being weighed. Body mass index is 20.34 kg/m .    General  - normal appearance, in no obvious distress  HEENT  - conjunctivae not injected, moist mucous membranes, normocephalic/atraumatic head, ears normal appearance, no lesions, mouth normal appearance, no scars, normal dentition and teeth present  CV  - normal pulses at posterior tib and dorsalis pedis  Pulm  - normal respiratory pattern, non-labored  Musculoskeletal - left foot  - stance: normal gait without limp, normal stance without excessive pronation, normal heel inversion with standing heel raise, no obvious leg length discrepancy, normal heel and toe walk  - inspection: no swelling or effusion,  normal bone and joint alignment, no obvious deformity  - palpation: no bony ttp, some soft tissue ttp surrounding the Incision site healing  - ROM: normal active and passive ROM of great and lesser toes, no pain with MT translation  - strength: 5/5 in all planes  Neuro  - no sensory or motor deficit, grossly normal coordination, normal muscle tone  Skin  - has resolved erythema with healing wound over top of foot, scab has formed, still small amount  of drainage, greatly improved  Psych  - interactive, appropriate, normal mood and affect  ASSESSMENT & PLAN  22 yo female with left foot cellulitis/abcess s/p I and D, resolving    Reviewed dressing changes  Reviewed completion of antibiotics  F/u in 3 days and once scab is further formed with healing will return to water for swimming workouts  Discussed with athlete and ATC  Appropriate PPE was utilized for prevention of spread of Covid-19.  Jc Fonseca MD, CAQSM        Jc Fonseca MD

## 2022-01-03 NOTE — PROGRESS NOTES
HISTORY OF PRESENT ILLNESS  Ms. Flores is a pleasant 21 year old year old female who presents to clinic today for followup for left foot cellulitis/abcess  Overall improved  Had I and D completed last week  And has almost completely resolved and healed  Continues to take antibiotics      MEDICAL HISTORY  There is no problem list on file for this patient.      Current Outpatient Medications   Medication Sig Dispense Refill     albuterol (PROAIR HFA/PROVENTIL HFA/VENTOLIN HFA) 108 (90 Base) MCG/ACT inhaler Inhale 2 puffs into the lungs every 4 hours as needed for shortness of breath / dyspnea or wheezing (and 15 minutes before exercise) 1 Inhaler 11     clindamycin (CLEOCIN) 300 MG capsule Take 1 capsule (300 mg) by mouth 4 times daily for 7 days 28 capsule 0     diclofenac (VOLTAREN) 75 MG EC tablet Take 1 tablet (75 mg) by mouth 2 times daily for 14 days 28 tablet 0     norgestimate-ethinyl estradiol (ORTHO-CYCLEN) 0.25-35 MG-MCG tablet Take 1 tablet by mouth daily       sulfamethoxazole-trimethoprim (BACTRIM DS) 800-160 MG tablet Take 1 tablet by mouth 2 times daily (Patient not taking: Reported on 1/3/2022) 10 tablet 0     traMADol (ULTRAM) 50 MG tablet Take 1 tablet (50 mg) by mouth nightly as needed for severe pain (Patient not taking: Reported on 1/3/2022) 6 tablet 0       No Known Allergies    No family history on file.  Social History     Socioeconomic History     Marital status: Single     Spouse name: Not on file     Number of children: Not on file     Years of education: Not on file     Highest education level: Not on file   Occupational History     Not on file   Tobacco Use     Smoking status: Never Smoker     Smokeless tobacco: Never Used   Substance and Sexual Activity     Alcohol use: Never     Drug use: Never     Sexual activity: Never   Other Topics Concern     Not on file   Social History Narrative     Not on file     Social Determinants of Health     Financial Resource Strain: Not on file   Food  Insecurity: Not on file   Transportation Needs: Not on file   Physical Activity: Not on file   Stress: Not on file   Social Connections: Not on file   Intimate Partner Violence: Not on file   Housing Stability: Not on file       Additional medical/Social/Surgical histories reviewed in [facility] and updated as appropriate.     REVIEW OF SYSTEMS (1/3/2022)  10 point ROS of systems including Constitutional, Eyes, Respiratory, Cardiovascular, Gastroenterology, Genitourinary, Integumentary, Musculoskeletal, Psychiatric, Allergic/Immunologic were all negative except for pertinent positives noted in my HPI.     PHYSICAL EXAM  Vitals:    01/03/22 1527   Height: 1.829 m (6')     Vital Signs: Ht 1.829 m (6')   LMP 12/29/2021   BMI 20.34 kg/m   Patient declined being weighed. Body mass index is 20.34 kg/m .    General  - normal appearance, in no obvious distress  HEENT  - conjunctivae not injected, moist mucous membranes, normocephalic/atraumatic head, ears normal appearance, no lesions, mouth normal appearance, no scars, normal dentition and teeth present  CV  - normal pulses at posterior tib and dorsalis pedis  Pulm  - normal respiratory pattern, non-labored  Musculoskeletal - left foot  - stance: normal gait without limp, normal stance without excessive pronation, normal heel inversion with standing heel raise, no obvious leg length discrepancy, normal heel and toe walk  - inspection: no swelling or effusion,  normal bone and joint alignment, no obvious deformity  - palpation: no bony ttp, some soft tissue ttp surrounding the Incision site healing  - ROM: normal active and passive ROM of great and lesser toes, no pain with MT translation  - strength: 5/5 in all planes  Neuro  - no sensory or motor deficit, grossly normal coordination, normal muscle tone  Skin  - has resolved erythema with healing wound over top of foot, scab has formed, still small amount of drainage, greatly improved  Psych  - interactive, appropriate, normal  mood and affect  ASSESSMENT & PLAN  22 yo female with left foot cellulitis/abcess s/p I and D, resolving    Reviewed dressing changes  Reviewed completion of antibiotics  F/u in 3 days and once scab is further formed with healing will return to water for swimming workouts  Discussed with athlete and ATC  Appropriate PPE was utilized for prevention of spread of Covid-19.  Jc Fonseca MD, CAQSM

## 2022-01-07 ENCOUNTER — OFFICE VISIT (OUTPATIENT)
Dept: FAMILY MEDICINE | Facility: CLINIC | Age: 22
End: 2022-01-07
Payer: COMMERCIAL

## 2022-01-07 VITALS
SYSTOLIC BLOOD PRESSURE: 119 MMHG | DIASTOLIC BLOOD PRESSURE: 72 MMHG | HEIGHT: 72 IN | BODY MASS INDEX: 20.24 KG/M2 | WEIGHT: 149.4 LBS | HEART RATE: 101 BPM

## 2022-01-07 DIAGNOSIS — L03.116 CELLULITIS OF LEFT LOWER EXTREMITY: Primary | ICD-10-CM

## 2022-01-07 ASSESSMENT — MIFFLIN-ST. JEOR: SCORE: 1559.67

## 2022-01-07 NOTE — PROGRESS NOTES
HISTORY OF PRESENT ILLNESS  Ms. Flores is a pleasant 21 year old year old female who presents to clinic today with left foot wound/infection followup  Clarice explains that her foot is feeling better  Still sore and slightly swollen but better than Monday  Had rash develop after taking a dose of bactrim, allergic reaction, so she stopped immediately  Location: left foot    Duration: 3.5 weeks  Timing: occurs intermittently  Context: occurs while walking and bending foot  Modifying factors:  resting and non-use makes it better, movement and use makes it worse  Associated signs & symptoms: mild swelling, skin normal except for small healing scab wound    MEDICAL HISTORY  There is no problem list on file for this patient.      Current Outpatient Medications   Medication Sig Dispense Refill     albuterol (PROAIR HFA/PROVENTIL HFA/VENTOLIN HFA) 108 (90 Base) MCG/ACT inhaler Inhale 2 puffs into the lungs every 4 hours as needed for shortness of breath / dyspnea or wheezing (and 15 minutes before exercise) 1 Inhaler 11     diclofenac (VOLTAREN) 75 MG EC tablet Take 1 tablet (75 mg) by mouth 2 times daily for 14 days 28 tablet 0     norgestimate-ethinyl estradiol (ORTHO-CYCLEN) 0.25-35 MG-MCG tablet Take 1 tablet by mouth daily       sulfamethoxazole-trimethoprim (BACTRIM DS) 800-160 MG tablet Take 1 tablet by mouth 2 times daily (Patient not taking: Reported on 1/3/2022) 10 tablet 0       No Known Allergies    No family history on file.  Social History     Socioeconomic History     Marital status: Single     Spouse name: Not on file     Number of children: Not on file     Years of education: Not on file     Highest education level: Not on file   Occupational History     Not on file   Tobacco Use     Smoking status: Never Smoker     Smokeless tobacco: Never Used   Substance and Sexual Activity     Alcohol use: Never     Drug use: Never     Sexual activity: Never   Other Topics Concern     Not on file   Social History Narrative      Not on file     Social Determinants of Health     Financial Resource Strain: Not on file   Food Insecurity: Not on file   Transportation Needs: Not on file   Physical Activity: Not on file   Stress: Not on file   Social Connections: Not on file   Intimate Partner Violence: Not on file   Housing Stability: Not on file       Additional medical/Social/Surgical histories reviewed in [facility] and updated as appropriate.     REVIEW OF SYSTEMS (1/7/2022)  10 point ROS of systems including Constitutional, Eyes, Respiratory, Cardiovascular, Gastroenterology, Genitourinary, Integumentary, Musculoskeletal, Psychiatric, Allergic/Immunologic were all negative except for pertinent positives noted in my HPI.     PHYSICAL EXAM  Vitals:    01/07/22 1008   BP: 119/72   Pulse: 101   Weight: 67.8 kg (149 lb 6.4 oz)   Height: 1.829 m (6')     Vital Signs: /72   Pulse 101   Ht 1.829 m (6')   Wt 67.8 kg (149 lb 6.4 oz)   LMP 12/29/2021   BMI 20.26 kg/m   Patient declined being weighed. Body mass index is 20.26 kg/m .    General  - normal appearance, in no obvious distress  HEENT  - conjunctivae not injected, moist mucous membranes, normocephalic/atraumatic head, ears normal appearance, no lesions, mouth normal appearance, no scars, normal dentition and teeth present  CV  - normal pulses at posterior tib and dorsalis pedis  Pulm  - normal respiratory pattern, non-labored  Musculoskeletal - left foot  - stance: normal gait without limp, normal stance without excessive pronation, normal heel inversion with standing heel raise, no obvious leg length discrepancy, normal heel and toe walk  - inspection: slight swelling over the top of her midfoot in area of infection, improved, and small <0.5 cm diameter healing scab wound  ,  normal bone and joint alignment, no obvious deformity  - palpation: no bony or soft tissue tenderness  - ROM: normal active and passive ROM of great and lesser toes, no pain with MT translation  - strength: 5/5  in all planes  Neuro  - no sensory or motor deficit, grossly normal coordination, normal muscle tone  Skin  - no ecchymosis, erythema, warmth, or induration, no obvious rash  Psych  - interactive, appropriate, normal mood and affect    ASSESSMENT & PLAN  20 yo female with followup for left foot cellulitis s/p I and D, improved    Cont. Diclofenac  Stopped bactrim  No further antibiotics at this time  Cont. Topical dressing cares  Can return to pool with occlusive dressing in 2-3 days PRN  Given toe/foot exercises  Ice PRN  Appropriate PPE was utilized for prevention of spread of Covid-19.  Jc Fonseca MD, CAQSM

## 2022-01-07 NOTE — LETTER
Date:January 10, 2022      Patient was self referred, no letter generated. Do not send.        [facility] Health Information

## 2022-01-07 NOTE — LETTER
1/7/2022      RE: Clarice Flores  [address] [place] [address] [place] [address]       HISTORY OF PRESENT ILLNESS  Ms. Flores is a pleasant 21 year old year old female who presents to clinic today with left foot wound/infection followup  Clarice explains that her foot is feeling better  Still sore and slightly swollen but better than Monday  Had rash develop after taking a dose of bactrim, allergic reaction, so she stopped immediately  Location: left foot    Duration: 3.5 weeks  Timing: occurs intermittently  Context: occurs while walking and bending foot  Modifying factors:  resting and non-use makes it better, movement and use makes it worse  Associated signs & symptoms: mild swelling, skin normal except for small healing scab wound    MEDICAL HISTORY  There is no problem list on file for this patient.      Current Outpatient Medications   Medication Sig Dispense Refill     albuterol (PROAIR HFA/PROVENTIL HFA/VENTOLIN HFA) 108 (90 Base) MCG/ACT inhaler Inhale 2 puffs into the lungs every 4 hours as needed for shortness of breath / dyspnea or wheezing (and 15 minutes before exercise) 1 Inhaler 11     diclofenac (VOLTAREN) 75 MG EC tablet Take 1 tablet (75 mg) by mouth 2 times daily for 14 days 28 tablet 0     norgestimate-ethinyl estradiol (ORTHO-CYCLEN) 0.25-35 MG-MCG tablet Take 1 tablet by mouth daily       sulfamethoxazole-trimethoprim (BACTRIM DS) 800-160 MG tablet Take 1 tablet by mouth 2 times daily (Patient not taking: Reported on 1/3/2022) 10 tablet 0       No Known Allergies    No family history on file.  Social History     Socioeconomic History     Marital status: Single     Spouse name: Not on file     Number of children: Not on file     Years of education: Not on file     Highest education level: Not on file   Occupational History     Not on file   Tobacco Use     Smoking status: Never Smoker     Smokeless tobacco: Never Used   Substance and Sexual Activity     Alcohol use: Never     Drug use: Never     Sexual  activity: Never   Other Topics Concern     Not on file   Social History Narrative     Not on file     Social Determinants of Health     Financial Resource Strain: Not on file   Food Insecurity: Not on file   Transportation Needs: Not on file   Physical Activity: Not on file   Stress: Not on file   Social Connections: Not on file   Intimate Partner Violence: Not on file   Housing Stability: Not on file       Additional medical/Social/Surgical histories reviewed in [facility] and updated as appropriate.     REVIEW OF SYSTEMS (1/7/2022)  10 point ROS of systems including Constitutional, Eyes, Respiratory, Cardiovascular, Gastroenterology, Genitourinary, Integumentary, Musculoskeletal, Psychiatric, Allergic/Immunologic were all negative except for pertinent positives noted in my HPI.     PHYSICAL EXAM  Vitals:    01/07/22 1008   BP: 119/72   Pulse: 101   Weight: 67.8 kg (149 lb 6.4 oz)   Height: 1.829 m (6')     Vital Signs: /72   Pulse 101   Ht 1.829 m (6')   Wt 67.8 kg (149 lb 6.4 oz)   LMP 12/29/2021   BMI 20.26 kg/m   Patient declined being weighed. Body mass index is 20.26 kg/m .    General  - normal appearance, in no obvious distress  HEENT  - conjunctivae not injected, moist mucous membranes, normocephalic/atraumatic head, ears normal appearance, no lesions, mouth normal appearance, no scars, normal dentition and teeth present  CV  - normal pulses at posterior tib and dorsalis pedis  Pulm  - normal respiratory pattern, non-labored  Musculoskeletal - left foot  - stance: normal gait without limp, normal stance without excessive pronation, normal heel inversion with standing heel raise, no obvious leg length discrepancy, normal heel and toe walk  - inspection: slight swelling over the top of her midfoot in area of infection, improved, and small <0.5 cm diameter healing scab wound  ,  normal bone and joint alignment, no obvious deformity  - palpation: no bony or soft tissue tenderness  - ROM: normal active and  passive ROM of great and lesser toes, no pain with MT translation  - strength: 5/5 in all planes  Neuro  - no sensory or motor deficit, grossly normal coordination, normal muscle tone  Skin  - no ecchymosis, erythema, warmth, or induration, no obvious rash  Psych  - interactive, appropriate, normal mood and affect    ASSESSMENT & PLAN  22 yo female with followup for left foot cellulitis s/p I and D, improved    Cont. Diclofenac  Stopped bactrim  No further antibiotics at this time  Cont. Topical dressing cares  Can return to pool with occlusive dressing in 2-3 days PRN  Given toe/foot exercises  Ice PRN  Appropriate PPE was utilized for prevention of spread of Covid-19.  Jc Fonseca MD, CAQSM        Jc Fonseca MD

## 2022-01-21 ENCOUNTER — OFFICE VISIT (OUTPATIENT)
Dept: FAMILY MEDICINE | Facility: CLINIC | Age: 22
End: 2022-01-21
Payer: COMMERCIAL

## 2022-01-21 VITALS
WEIGHT: 149 LBS | HEART RATE: 81 BPM | DIASTOLIC BLOOD PRESSURE: 61 MMHG | BODY MASS INDEX: 20.18 KG/M2 | HEIGHT: 72 IN | SYSTOLIC BLOOD PRESSURE: 124 MMHG

## 2022-01-21 DIAGNOSIS — L03.116 CELLULITIS OF LEFT LOWER EXTREMITY: Primary | ICD-10-CM

## 2022-01-21 ASSESSMENT — MIFFLIN-ST. JEOR: SCORE: 1557.86

## 2022-01-21 NOTE — PROGRESS NOTES
HISTORY OF PRESENT ILLNESS  Ms. Flores is a pleasant 21 year old year old female who presents to clinic today for followup for left foot abcess I and d resolved  No concerns with infection  Has some erythema from shoe friction   Using donut pad    MEDICAL HISTORY  There is no problem list on file for this patient.      Current Outpatient Medications   Medication Sig Dispense Refill     albuterol (PROAIR HFA/PROVENTIL HFA/VENTOLIN HFA) 108 (90 Base) MCG/ACT inhaler Inhale 2 puffs into the lungs every 4 hours as needed for shortness of breath / dyspnea or wheezing (and 15 minutes before exercise) 1 Inhaler 11     diclofenac (VOLTAREN) 75 MG EC tablet Take 1 tablet (75 mg) by mouth 2 times daily for 14 days 28 tablet 0     norgestimate-ethinyl estradiol (ORTHO-CYCLEN) 0.25-35 MG-MCG tablet Take 1 tablet by mouth daily       sulfamethoxazole-trimethoprim (BACTRIM DS) 800-160 MG tablet Take 1 tablet by mouth 2 times daily (Patient not taking: Reported on 1/3/2022) 10 tablet 0       Allergies   Allergen Reactions     Bactrim [Sulfamethoxazole W/Trimethoprim]        No family history on file.  Social History     Socioeconomic History     Marital status: Single     Spouse name: Not on file     Number of children: Not on file     Years of education: Not on file     Highest education level: Not on file   Occupational History     Not on file   Tobacco Use     Smoking status: Never Smoker     Smokeless tobacco: Never Used   Substance and Sexual Activity     Alcohol use: Never     Drug use: Never     Sexual activity: Never   Other Topics Concern     Not on file   Social History Narrative     Not on file     Social Determinants of Health     Financial Resource Strain: Not on file   Food Insecurity: Not on file   Transportation Needs: Not on file   Physical Activity: Not on file   Stress: Not on file   Social Connections: Not on file   Intimate Partner Violence: Not on file   Housing Stability: Not on file       Additional  medical/Social/Surgical histories reviewed in [facility] and updated as appropriate.     REVIEW OF SYSTEMS (1/21/2022)  10 point ROS of systems including Constitutional, Eyes, Respiratory, Cardiovascular, Gastroenterology, Genitourinary, Integumentary, Musculoskeletal, Psychiatric, Allergic/Immunologic were all negative except for pertinent positives noted in my HPI.     PHYSICAL EXAM  Vitals:    01/21/22 1050   BP: 124/61   Pulse: 81   Weight: 67.6 kg (149 lb)   Height: 1.829 m (6')     Vital Signs: /61   Pulse 81   Ht 1.829 m (6')   Wt 67.6 kg (149 lb)   LMP 12/29/2021   BMI 20.21 kg/m   Patient declined being weighed. Body mass index is 20.21 kg/m .    General  - normal appearance, in no obvious distress  HEENT  - conjunctivae not injected, moist mucous membranes, normocephalic/atraumatic head, ears normal appearance, no lesions, mouth normal appearance, no scars, normal dentition and teeth present  CV  - normal pulses at posterior tib and dorsalis pedis  Pulm  - normal respiratory pattern, non-labored  Musculoskeletal - left foot  - stance: normal gait without limp, normal stance without excessive pronation, normal heel inversion with standing heel raise, no obvious leg length discrepancy, normal heel and toe walk  - inspection: slight swelling over the top of her midfoot in area of infection, improved, and resolved scab  ,  normal bone and joint alignment, no obvious deformity  - palpation: no bony or soft tissue tenderness  - ROM: normal active and passive ROM of great and lesser toes, no pain with MT translation  - strength: 5/5 in all planes  Neuro  - no sensory or motor deficit, grossly normal coordination, normal muscle tone  Skin  - no ecchymosis, erythema, warmth, or induration, no obvious rash  Psych  - interactive, appropriate, normal mood and affect     ASSESSMENT & PLAN  20 yo female with followup for left foot cellulitis s/p I and D, improved overall       No further antibiotics at this  time    Given toe/foot exercises  Ice PRN  Appropriate PPE was utilized for prevention of spread of Covid-19.  Jc Fonseca MD, CAQSM

## 2022-01-21 NOTE — LETTER
1/21/2022      RE: Clarice Flores  [address] [place] [address] [place] [address]       HISTORY OF PRESENT ILLNESS  Ms. Flores is a pleasant 21 year old year old female who presents to clinic today for followup for left foot abcess I and d resolved  No concerns with infection  Has some erythema from shoe friction   Using donut pad    MEDICAL HISTORY  There is no problem list on file for this patient.      Current Outpatient Medications   Medication Sig Dispense Refill     albuterol (PROAIR HFA/PROVENTIL HFA/VENTOLIN HFA) 108 (90 Base) MCG/ACT inhaler Inhale 2 puffs into the lungs every 4 hours as needed for shortness of breath / dyspnea or wheezing (and 15 minutes before exercise) 1 Inhaler 11     diclofenac (VOLTAREN) 75 MG EC tablet Take 1 tablet (75 mg) by mouth 2 times daily for 14 days 28 tablet 0     norgestimate-ethinyl estradiol (ORTHO-CYCLEN) 0.25-35 MG-MCG tablet Take 1 tablet by mouth daily       sulfamethoxazole-trimethoprim (BACTRIM DS) 800-160 MG tablet Take 1 tablet by mouth 2 times daily (Patient not taking: Reported on 1/3/2022) 10 tablet 0       Allergies   Allergen Reactions     Bactrim [Sulfamethoxazole W/Trimethoprim]        No family history on file.  Social History     Socioeconomic History     Marital status: Single     Spouse name: Not on file     Number of children: Not on file     Years of education: Not on file     Highest education level: Not on file   Occupational History     Not on file   Tobacco Use     Smoking status: Never Smoker     Smokeless tobacco: Never Used   Substance and Sexual Activity     Alcohol use: Never     Drug use: Never     Sexual activity: Never   Other Topics Concern     Not on file   Social History Narrative     Not on file     Social Determinants of Health     Financial Resource Strain: Not on file   Food Insecurity: Not on file   Transportation Needs: Not on file   Physical Activity: Not on file   Stress: Not on file   Social Connections: Not on file   Intimate Partner  Violence: Not on file   Housing Stability: Not on file       Additional medical/Social/Surgical histories reviewed in [facility] and updated as appropriate.     REVIEW OF SYSTEMS (1/21/2022)  10 point ROS of systems including Constitutional, Eyes, Respiratory, Cardiovascular, Gastroenterology, Genitourinary, Integumentary, Musculoskeletal, Psychiatric, Allergic/Immunologic were all negative except for pertinent positives noted in my HPI.     PHYSICAL EXAM  Vitals:    01/21/22 1050   BP: 124/61   Pulse: 81   Weight: 67.6 kg (149 lb)   Height: 1.829 m (6')     Vital Signs: /61   Pulse 81   Ht 1.829 m (6')   Wt 67.6 kg (149 lb)   LMP 12/29/2021   BMI 20.21 kg/m   Patient declined being weighed. Body mass index is 20.21 kg/m .    General  - normal appearance, in no obvious distress  HEENT  - conjunctivae not injected, moist mucous membranes, normocephalic/atraumatic head, ears normal appearance, no lesions, mouth normal appearance, no scars, normal dentition and teeth present  CV  - normal pulses at posterior tib and dorsalis pedis  Pulm  - normal respiratory pattern, non-labored  Musculoskeletal - left foot  - stance: normal gait without limp, normal stance without excessive pronation, normal heel inversion with standing heel raise, no obvious leg length discrepancy, normal heel and toe walk  - inspection: slight swelling over the top of her midfoot in area of infection, improved, and resolved scab  ,  normal bone and joint alignment, no obvious deformity  - palpation: no bony or soft tissue tenderness  - ROM: normal active and passive ROM of great and lesser toes, no pain with MT translation  - strength: 5/5 in all planes  Neuro  - no sensory or motor deficit, grossly normal coordination, normal muscle tone  Skin  - no ecchymosis, erythema, warmth, or induration, no obvious rash  Psych  - interactive, appropriate, normal mood and affect     ASSESSMENT & PLAN  20 yo female with followup for left foot cellulitis  s/p I and D, improved overall       No further antibiotics at this time    Given toe/foot exercises  Ice PRN  Appropriate PPE was utilized for prevention of spread of Covid-19.  Jc Fonseca MD, CAM      Jc Fonseca MD

## 2022-01-21 NOTE — LETTER
Date:January 25, 2022      Patient was self referred, no letter generated. Do not send.        [facility] Health Information

## 2022-02-11 ENCOUNTER — DOCUMENTATION ONLY (OUTPATIENT)
Dept: FAMILY MEDICINE | Facility: CLINIC | Age: 22
End: 2022-02-11
Payer: COMMERCIAL

## 2022-02-21 NOTE — PROGRESS NOTES
[facility] ATHLETIC MEDICINE  [facility]   Sport Psychology Progress Note      Location of Visit: Session conducted via telehealth. Clarice reports that her physical location is: [address] [place], [place]  Date of Visit: February 11, 2022  Duration of Session: 30-35 minutes    Suicide Assessment:  Clraice denies current urges to self-harm, suicidal ideation, means, plan, or intent.    Mental Status & Observations:  Clarice appeared generally alert and oriented. Dress was appropriate to the weather and occasion. Grooming and hygiene were appropriate. Eye contact was good. Speech was of normal volume and normal. Mood was appropriate with congruent affect. Thought processes were relevant, logical and goal-directed. Thought content was within normal limits with no evidence of psychotic or paranoid features. Memory appeared intact. Insight and judgment appeared age appropriate with good focus in session.  She exhibited normal motor activity during the appointment.  Behavior was candid and cooperative.      Observations and response to counseling:  Clarice arrived to session on time and was actively engaged throughout. Clarice reports experiencing poor mood this week due to stressors and foot injury (not related to sport).    Intervention:  Empathetic listening and supportive counseling offered throughout. Co-created therapeutic space to explore poor mood/attitude and frustration/irritability with recent events. Explored interpersonal relationships with teammates/roommates and subsequent decision to move into a new residence. Normalized and validated Clarice's concerns and supported her decision to prioritize her needs. Clarice disclosed that she will not be competing at [employer]. Processed feelings of disappointment related tot his. Reflected on Clarice's strengths and encouraged open/honest dialogue and continued prioritization of self care.    Therapy objectives/goals:  Provide support    Therapy follow-up  plan:  Individual counseling sessions as needed.      Paula Watson PsyD

## 2022-02-22 NOTE — PROGRESS NOTES
I have reviewed and agree with the document of this note.  I did not personally see the patient.    Bello Padilla, PhD, LP

## 2022-02-25 ENCOUNTER — DOCUMENTATION ONLY (OUTPATIENT)
Dept: FAMILY MEDICINE | Facility: CLINIC | Age: 22
End: 2022-02-25
Payer: COMMERCIAL

## 2022-03-02 NOTE — PROGRESS NOTES
"[facility] ATHLETIC MEDICINE  [facility]   Sport Psychology Progress Note      Location of Visit: Session conducted via telehealth. 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    Suicide Assessment:  Clarice denies current urges to self-harm, suicidal ideation, means, plan, or intent.    Mental Status & Observations:  Clarice appeared generally alert and oriented. Dress was appropriate to the weather and occasion. Grooming and hygiene were appropriate. Eye contact was good. Speech was of normal volume and normal. Mood was appropriate with congruent affect. Thought processes were relevant, logical and goal-directed. Thought content was within normal limits with no evidence of psychotic or paranoid features. Memory appeared intact. Insight and judgment appeared age appropriate with good focus in session.  She exhibited normal motor activity during the appointment.  Behavior was candid and cooperative.      Observations and response to counseling:  Clarice arrived to session on time and was actively engaged throughout. Clarice presented with a positive mood. Reports benefits of surrounding herself with positive people and the welcomed impact this has had on her in recent weeks. Described plan to decompress from sport and academic stressors this weekend. Discussed plans for spring break (visiting family in [place]).    Intervention:  Empathetic listening and supportive counseling offered throughout. Explored effective habits and strategies Clarice has adopted and the impacts these have had on her mood. Clarice appears to be managing stressors in a healthy way and has a positive outlook and perspective in most situations. Clarice discussed \"busy\" schedule and explored ways to manage her time and prioritize self care and value " based activities.     Therapy objectives/goals:  Provide support    Therapy follow-up plan:  Individual counseling sessions as needed.      Paula Watson PsyD

## 2022-03-25 ENCOUNTER — DOCUMENTATION ONLY (OUTPATIENT)
Dept: FAMILY MEDICINE | Facility: CLINIC | Age: 22
End: 2022-03-25
Payer: COMMERCIAL

## 2022-03-31 NOTE — PROGRESS NOTES
[facility] ATHLETIC MEDICINE  [facility]   Sport Psychology Progress Note      Location of Visit: Session conducted via telehealth. Clarice reports that her physical location is: [address] [place], [place]  Date of Visit: March 25, 2022  Duration of Session: 25-30 minutes, Clarice noted a last minute change in her work schedule and desire to end session early.    Suicide Assessment:  Clarice denies current urges to self-harm, suicidal ideation, means, plan, or intent.    Mental Status & Observations:  Clarice appeared generally alert and oriented. Dress was appropriate to the weather and occasion. Grooming and hygiene were appropriate. Eye contact was good. Speech was of normal volume and normal. Mood was appropriate with congruent affect. Thought processes were relevant, logical and goal-directed. Thought content was within normal limits with no evidence of psychotic or paranoid features. Memory appeared intact. Insight and judgment appeared age appropriate with good focus in session.  She exhibited normal motor activity during the appointment.  Behavior was candid and cooperative.      Observations and response to counseling:  Clarice arrived to session on time and was actively engaged throughout. Clarice reports continued positive attitude and mood since last session. Reports increased work schedule and more relaxed swimming schedule. Noted spending more time with friends. Utilized session to review effective time and stress management strategies and explore interpersonal relationships.     Intervention:  Empathetic listening and supportive counseling offered throughout. Discussed daily schedule, time constraints, and effective time management strategies. Discussed interpersonal relationships with peers, management of social activities, and goals for the remainder of her semester. Continued conversations regarding present moment awareness and prioritization of mindfulness exercises.    Therapy  objectives/goals:  Provide support    Therapy follow-up plan:  Individual counseling sessions as needed.      Paula Watson PsyD

## 2022-04-05 ENCOUNTER — DOCUMENTATION ONLY (OUTPATIENT)
Dept: FAMILY MEDICINE | Facility: CLINIC | Age: 22
End: 2022-04-05
Payer: COMMERCIAL

## 2022-04-05 NOTE — PROGRESS NOTES
"[facility] ATHLETICS  [facility] ATC initial assessment note  Date of service performed: 4/5/22    Concern: Acute injury  Body part: Rib  Description: Left  Injury: Cartilage  Type: Athletics related  Date of injury: 3/31/22    S: Pt c/o L rib/shoulder pain, posterior, along medial border of L scapula.  P! Is sharp, began on Friday at practice and worsened with each practice.  Most p! Is felt overhead. Pt describes p! As feeling like a rib is \"out of place\".  Pt was unable to complete practice last night, only swam the warm up. Reports it hurts at night.  Pt reports taking 200 mg ibuprofen helped on Saturday, but she has not taken any meds since then. No specific mechanism of injury. Chronic onset over last 3-4 days, no acute trauma reported.    O: No visible deformity, bruising, swelling.  Pt denies N/T and any radiating symptoms. Mildly tender to palpate over 4th and 5th ribs at rib heads. Spinous processes not TTP.  Some p! With deep breath and valsalva.      ROM   PROM at the shoulder is full, p! Free   AROM at the shoulder is full, equal bilat, p! Felt posteriorly when shoulder is above 100 degrees GH flexion.   Passive Trunk extension full, p! Free   Active Trunk extension full, p! Free    Strength   5/5 for all motions at the shoulder   5/5 Trunk extension   5/5  Strength     Special Tests   [-] Lateral rib compression   [-] AP rib compression    A: Possible malalignment of 4th and/or 5th rib heads due to muscle imbalance.  Subscapularis knotted, Soft tissue inflammation around periscapular region, possible intercostal strain.     P: Combo treatment today, 5 Min.  Pt reported mild but immediate p! Relief.  I have set up an appointment for her to check in with the Chiropractor today at 12:15 PM in [facility] to further assess her rib heads and alignment.     Modify practice as needed.  I have also recommended 200 mg ibuprofen 3x/day with meals today and tomorrow.  If no resolution by Thursday, I " will have her see Dr Fonseca on Friday morning in [facility].     Alis Torres, ATC

## 2022-04-19 ENCOUNTER — DOCUMENTATION ONLY (OUTPATIENT)
Dept: FAMILY MEDICINE | Facility: CLINIC | Age: 22
End: 2022-04-19
Payer: COMMERCIAL

## 2022-04-19 NOTE — PROGRESS NOTES
[facility] ATHLETICS  [facility] ATC initial assessment note  Date of service performed: 4/19/2022    Concern: Mental health  Body part: N/A  Description: Anxiety  Injury: N/A  Type: Non-athletics related  Date of injury: N/A    S: Pt reports to ATC that she started 10mg lexapro on Sunday April 17th.  Her PCP back home prescribed this medication during a virtual appointment, diagnosed her w LUIS F.  Pt has a plan to f/u w this PCP again in a month. Pt reports younger sister also struggles with anxiety, takes lexapro and responded well.      O: Pt has been attending SP sessions with Dr Mitchell but has not been seen since March 25.     A: LUIS F    P: I have scheduled the pt for 2 SP sessions with Dr Mitchell, on 4/25 and 5/2 at 11am.  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  There is no MARIBEL for this pt at this time, but she is aware she can sign one if & when she feels comfortable/necessary.     Pt also noted she may consider going home for a month to be with family for a bit, TBD.     Alis Torres, ATC

## 2022-04-25 ENCOUNTER — DOCUMENTATION ONLY (OUTPATIENT)
Dept: FAMILY MEDICINE | Facility: CLINIC | Age: 22
End: 2022-04-25
Payer: COMMERCIAL
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
## 2022-05-25 ENCOUNTER — OFFICE VISIT (OUTPATIENT)
Dept: ORTHOPEDICS | Facility: CLINIC | Age: 22
End: 2022-05-25
Payer: COMMERCIAL

## 2022-05-25 VITALS
DIASTOLIC BLOOD PRESSURE: 74 MMHG | WEIGHT: 148.8 LBS | SYSTOLIC BLOOD PRESSURE: 118 MMHG | HEART RATE: 78 BPM | HEIGHT: 72 IN | BODY MASS INDEX: 20.15 KG/M2

## 2022-05-25 DIAGNOSIS — F41.1 GENERALIZED ANXIETY DISORDER: Primary | ICD-10-CM

## 2022-05-25 NOTE — PROGRESS NOTES
HISTORY OF PRESENT ILLNESS  Ms. Flores is a pleasant 21 year old female who presents to clinic today with anxiety.  Clarice reports that she has dealt with a baseline, low level anxiety for some time, although this has gotten acutely worse recently.  She had a string of very difficult things happen this year, lately these have been causing panic attacks on an almost weekly basis.  Her last panic attack was 2 weeks ago.  She has been seen in person and virtually by her physician in [place], who prescribed her Lexapro.  Unfortunately this made her fatigued and depressed, she stopped this and is feeling more normal.  She was prescribed Zoloft and has not started this yet.  Notably, she is doing much better over the past couple of weeks.  She has an issue with roommates that has been resolved to a degree, she also has finished school.  She denies sadness or feeling down, she states that anxiety is her main issue.        Additional history: as documented    MEDICAL HISTORY  There is no problem list on file for this patient.      Current Outpatient Medications   Medication Sig Dispense Refill    albuterol (PROAIR HFA/PROVENTIL HFA/VENTOLIN HFA) 108 (90 Base) MCG/ACT inhaler Inhale 2 puffs into the lungs every 4 hours as needed for shortness of breath / dyspnea or wheezing (and 15 minutes before exercise) 1 Inhaler 11    norgestimate-ethinyl estradiol (ORTHO-CYCLEN) 0.25-35 MG-MCG tablet Take 1 tablet by mouth daily      sertraline (ZOLOFT) 50 MG tablet Take 50 mg by mouth daily Prescribed by PCP, pt has not started yet (5/25/22)      diclofenac (VOLTAREN) 75 MG EC tablet Take 1 tablet (75 mg) by mouth 2 times daily for 14 days 28 tablet 0    sulfamethoxazole-trimethoprim (BACTRIM DS) 800-160 MG tablet Take 1 tablet by mouth 2 times daily (Patient not taking: No sig reported) 10 tablet 0       Allergies   Allergen Reactions    Bactrim [Sulfamethoxazole W/Trimethoprim]        No family history on file.    Additional  medical/Social/Surgical histories reviewed in [facility] and updated as appropriate.        PHYSICAL EXAM  /74   Pulse 78   Ht 1.829 m (6')   Wt 67.5 kg (148 lb 12.8 oz)   LMP 05/21/2022   BMI 20.18 kg/m      Clarice exhibits a normal mood and affect, she is interactive.  Her heart rates and rhythm are normal, her distal pulses are normal.  Her breath sounds are free of rhonchi or rales.       ASSESSMENT & PLAN  Ms. Flores is a 21 year old female who presents to clinic today with anxiety.    Clarice and I had a good discussion centering around her symptoms.  I do agree with her that her symptoms do seem to be trending more towards anxiety than depression.  Zoloft very well may be a good medication for her.  We discussed that the timing of taking Zoloft would be up to her, although she is leaving starting this now, given that she has a lighter schedule and that she can better gauge her mood on the medication.  She is also following up with her physician at home in [place].  We can follow-up as needed for this and other issues in the future.    It was a pleasure seeing Clarice today.    Jc Becerril DO, [facility]  Primary Care Sports Medicine      This note was constructed using Dragon dictation software, please excuse any minor errors in spelling, grammar, or syntax.

## 2022-05-25 NOTE — LETTER
Date:May 26, 2022      Patient was self referred, no letter generated. Do not send.        [facility] Health Information

## 2022-05-25 NOTE — LETTER
5/25/2022      RE: Clarice Flores  [address] [place] [address]     Dear Colleague,    Thank you for referring your patient, Clarice Flores, to the [facility] STUDENT ATHLETIC CLINIC. Please see a copy of my visit note below.      HISTORY OF PRESENT ILLNESS  Ms. Flores is a pleasant 21 year old female who presents to clinic today with anxiety.  Clarice reports that she has dealt with a baseline, low level anxiety for some time, although this has gotten acutely worse recently.  She had a string of very difficult things happen this year, lately these have been causing panic attacks on an almost weekly basis.  Her last panic attack was 2 weeks ago.  She has been seen in person and virtually by her physician in [place], who prescribed her Lexapro.  Unfortunately this made her fatigued and depressed, she stopped this and is feeling more normal.  She was prescribed Zoloft and has not started this yet.  Notably, she is doing much better over the past couple of weeks.  She has an issue with roommates that has been resolved to a degree, she also has finished school.  She denies sadness or feeling down, she states that anxiety is her main issue.        Additional history: as documented    MEDICAL HISTORY  There is no problem list on file for this patient.      Current Outpatient Medications   Medication Sig Dispense Refill     albuterol (PROAIR HFA/PROVENTIL HFA/VENTOLIN HFA) 108 (90 Base) MCG/ACT inhaler Inhale 2 puffs into the lungs every 4 hours as needed for shortness of breath / dyspnea or wheezing (and 15 minutes before exercise) 1 Inhaler 11     norgestimate-ethinyl estradiol (ORTHO-CYCLEN) 0.25-35 MG-MCG tablet Take 1 tablet by mouth daily       sertraline (ZOLOFT) 50 MG tablet Take 50 mg by mouth daily Prescribed by PCP, pt has not started yet (5/25/22)       diclofenac (VOLTAREN) 75 MG EC tablet Take 1 tablet (75 mg) by mouth 2 times daily for 14 days 28 tablet 0     sulfamethoxazole-trimethoprim (BACTRIM DS) 800-160  MG tablet Take 1 tablet by mouth 2 times daily (Patient not taking: No sig reported) 10 tablet 0       Allergies   Allergen Reactions     Bactrim [Sulfamethoxazole W/Trimethoprim]        No family history on file.    Additional medical/Social/Surgical histories reviewed in [facility] and updated as appropriate.        PHYSICAL EXAM  /74   Pulse 78   Ht 1.829 m (6')   Wt 67.5 kg (148 lb 12.8 oz)   LMP 05/21/2022   BMI 20.18 kg/m      Clarice exhibits a normal mood and affect, she is interactive.  Her heart rates and rhythm are normal, her distal pulses are normal.  Her breath sounds are free of rhonchi or rales.       ASSESSMENT & PLAN  Ms. Flores is a 21 year old female who presents to clinic today with anxiety.    Clarice and I had a good discussion centering around her symptoms.  I do agree with her that her symptoms do seem to be trending more towards anxiety than depression.  Zoloft very well may be a good medication for her.  We discussed that the timing of taking Zoloft would be up to her, although she is leaving starting this now, given that she has a lighter schedule and that she can better gauge her mood on the medication.  She is also following up with her physician at home in [place].  We can follow-up as needed for this and other issues in the future.    It was a pleasure seeing Clarice today.    Jc Becerril DO, CAM  Primary Care Sports Medicine      This note was constructed using Dragon dictation software, please excuse any minor errors in spelling, grammar, or syntax.        Again, thank you for allowing me to participate in the care of your patient.      Sincerely,    Jc Becerril DO

## 2022-06-06 ENCOUNTER — DOCUMENTATION ONLY (OUTPATIENT)
Dept: FAMILY MEDICINE | Facility: CLINIC | Age: 22
End: 2022-06-06
Payer: COMMERCIAL

## 2022-06-06 NOTE — PROGRESS NOTES
[facility] ATHLETIC MEDICINE  [facility]   Sport Psychology Progress Note      Location of Visit: Session conducted in-person, [facility], [address]  Date of Visit: June 6, 2022  Duration of Session: 45 minutes  Suicide Assessment:  Clarice denies current urges to self-harm, suicidal ideation, means, plan, or intent.    Mental Status & Observations:  Clarice appeared generally alert and oriented. Dress was appropriate to the weather and occasion. Grooming and hygiene were appropriate. Eye contact was good. Speech was of normal volume. Mood was appropriate with congruent affect. Thought processes were relevant, logical and goal-directed. Thought content was within normal limits with no evidence of psychotic or paranoid features. Memory appeared intact. Insight and judgment appeared age appropriate with good focus in session.  She exhibited normal motor activity during the appointment.  Behavior was candid and cooperative.      Observations and response to counseling:  Clarice arrived to session on time and was actively engaged throughout. Reports improvement in mood since last session. Noted that conflicts with teammates have resolved. Describes summer mood/attitude in the pool is much better. Reports that she is continuing to live on her own until August when she moves back into her original rental residence but with different roommates. Reports continued desire to improve sport performance and enhance her summer training.     Intervention:  Empathetic listening and supportive counseling offered throughout. Reflected on end of spring semester. Discussed relationships with her coaches and teammates. Identified summer training goals. Discussed recent summer meet and upcoming meets. Noted goal of making national qualifying times in 100 backstroke event. Discussed time management plan to balance summer classes, work, swimming, and social activities. Encouraged continued prioritization of needs and  engagement with mindfulness an self care activities.       Therapy objectives/goals:  Provide support  Improve sport performance.  Improve interpersonal relationships and boundary setting.    Therapy follow-up plan:  Individual counseling sessions as needed.  Follow-up scheduled on June 22, 2022 at 9:30 AM.      Paula Watson PsyD

## 2022-06-29 ENCOUNTER — DOCUMENTATION ONLY (OUTPATIENT)
Dept: FAMILY MEDICINE | Facility: CLINIC | Age: 22
End: 2022-06-29

## 2022-06-29 DIAGNOSIS — J45.40 MODERATE PERSISTENT ASTHMA WITHOUT COMPLICATION: Primary | ICD-10-CM

## 2022-06-29 RX ORDER — BUDESONIDE AND FORMOTEROL FUMARATE DIHYDRATE 160; 4.5 UG/1; UG/1
2 AEROSOL RESPIRATORY (INHALATION) 2 TIMES DAILY
Qty: 10.2 G | Refills: 11 | Status: SHIPPED | OUTPATIENT
Start: 2022-06-29

## 2022-06-29 NOTE — PROGRESS NOTES
"[facility] ATHLETIC MEDICINE  [facility]   Sport Psychology Progress Note      Location of Visit: Session conducted in-person, [facility], [address]  Date of Visit: June 29, 2022  Duration of Session: 45 minutes    Suicide Assessment:  Clarice denies current urges to self-harm, suicidal ideation, means, plan, or intent.    Mental Status & Observations:  Clarice appeared generally alert and oriented. Dress was appropriate to the weather and occasion. Grooming and hygiene were appropriate. Eye contact was good. Speech was of normal volume. Mood was appropriate with congruent affect. Thought processes were relevant, logical and goal-directed. Thought content was within normal limits with no evidence of psychotic or paranoid features. Memory appeared intact. Insight and judgment appeared age appropriate with good focus in session.  She exhibited normal motor activity during the appointment.  Behavior was candid and cooperative.      Observations and response to counseling:  Clarice arrived to session on time and was actively engaged throughout. Clarice reports interpersonal conflict with friend re: care for their kitten. Disclosed that a number of family deaths have occurred and that she will be traveling tomorrow morning to [place] to attend funerals. Clarice reports that she performed well during her last swim meet and is feeling confident in her physical ability. Reports continued stress management for life stressors and interpersonal relationships.     Intervention:  Empathetic listening and supportive counseling offered throughout. Engaged in therapeutic dialogue about life stressors and relationships with peers. Explored Clarice's inability to prioritize needs until what feels like \"the last straw\". Challenged Clarice to increase awareness in this area and make changes to this behavior. Emphasized the importance of self care and prioritizing time for self.    Therapy objectives/goals:  Provide " support  Improve sport performance.  Improve interpersonal relationships and boundary setting.    Therapy follow-up plan:  Individual counseling sessions bi-weekly        Paula Watson PsyD

## 2022-07-06 ENCOUNTER — DOCUMENTATION ONLY (OUTPATIENT)
Dept: FAMILY MEDICINE | Facility: CLINIC | Age: 22
End: 2022-07-06

## 2022-07-06 NOTE — PROGRESS NOTES
[facility] ATHLETIC MEDICINE  [facility]   Sport Psychology Progress Note      Location of Visit: Session conducted in-person, [facility], [address]  Date of Visit: July 6, 2022  Duration of Session: 45 minutes    Suicide Assessment:  Clarice denies current urges to self-harm, suicidal ideation, means, plan, or intent.    Mental Status & Observations:  Clarice appeared generally alert and oriented. Dress was appropriate to the weather and occasion. Grooming and hygiene were appropriate. Eye contact was good. Speech was of normal volume. Mood was appropriate with congruent affect. Thought processes were relevant, logical and goal-directed. Thought content was within normal limits with no evidence of psychotic or paranoid features. Memory appeared intact. Insight and judgment appeared age appropriate with good focus in session.  She exhibited normal motor activity during the appointment.  Behavior was candid and cooperative.      Observations and response to counseling:  Clarice arrived to session on time and was actively engaged throughout. Reports that she returned to [place] this morning following her trip to [place] and [place]. Reports that she was able to connect with family members, friends, and prioritize rest/relaxation during her trip. Reports that conflict with peer has been resolved. Utilized session to explore/review stress and time management and continue supportive discussion re: sport performance, sport goals, and athletic identity.    Intervention:  Empathetic listening and supportive counseling offered throughout. Discussed benefits of engaging with family and social supports. Explored how she was able to prioritize her needs in the past week. Reflected on Clarice's emotional wellness at this time last year and discussed improvement in her ability to set boundaries and communicate needs. Discussed sport performance (Clarice has an upcoming meet to close out the summer). Clarice noted  that while she doesn't have high expectations of her performance from a time standpoint, she does intend to enjoy the experience and prioritize having fun in the moment. Discussed plans for the remainder of her summer and reviewed time and stress management strategies.     Therapy objectives/goals:  Provide support  Improve sport performance.  Improve interpersonal relationships and boundary setting.    Therapy follow-up plan:  Individual counseling sessions bi-weekly        Paula Watson PsyD

## 2022-09-24 ENCOUNTER — HEALTH MAINTENANCE LETTER (OUTPATIENT)
Age: 22
End: 2022-09-24

## 2023-01-29 ENCOUNTER — HEALTH MAINTENANCE LETTER (OUTPATIENT)
Age: 23
End: 2023-01-29

## 2024-02-25 ENCOUNTER — HEALTH MAINTENANCE LETTER (OUTPATIENT)
Age: 24
End: 2024-02-25

## 2025-03-15 ENCOUNTER — HEALTH MAINTENANCE LETTER (OUTPATIENT)
Age: 25
End: 2025-03-15